# Patient Record
Sex: MALE | Race: WHITE | Employment: FULL TIME | URBAN - METROPOLITAN AREA
[De-identification: names, ages, dates, MRNs, and addresses within clinical notes are randomized per-mention and may not be internally consistent; named-entity substitution may affect disease eponyms.]

---

## 2018-01-17 ENCOUNTER — OFFICE VISIT (OUTPATIENT)
Dept: CARDIOLOGY CLINIC | Age: 58
End: 2018-01-17

## 2018-01-17 VITALS
DIASTOLIC BLOOD PRESSURE: 80 MMHG | OXYGEN SATURATION: 97 % | WEIGHT: 215 LBS | HEIGHT: 74 IN | BODY MASS INDEX: 27.59 KG/M2 | SYSTOLIC BLOOD PRESSURE: 164 MMHG | HEART RATE: 86 BPM

## 2018-01-17 DIAGNOSIS — I48.0 PAF (PAROXYSMAL ATRIAL FIBRILLATION) (HCC): Primary | ICD-10-CM

## 2018-01-17 DIAGNOSIS — R00.2 PALPITATIONS: ICD-10-CM

## 2018-01-17 DIAGNOSIS — R03.0 ELEVATED BP WITHOUT DIAGNOSIS OF HYPERTENSION: ICD-10-CM

## 2018-01-17 PROCEDURE — 1036F TOBACCO NON-USER: CPT | Performed by: INTERNAL MEDICINE

## 2018-01-17 PROCEDURE — G8484 FLU IMMUNIZE NO ADMIN: HCPCS | Performed by: INTERNAL MEDICINE

## 2018-01-17 PROCEDURE — G8419 CALC BMI OUT NRM PARAM NOF/U: HCPCS | Performed by: INTERNAL MEDICINE

## 2018-01-17 PROCEDURE — 99204 OFFICE O/P NEW MOD 45 MIN: CPT | Performed by: INTERNAL MEDICINE

## 2018-01-17 PROCEDURE — 3017F COLORECTAL CA SCREEN DOC REV: CPT | Performed by: INTERNAL MEDICINE

## 2018-01-17 PROCEDURE — G8427 DOCREV CUR MEDS BY ELIG CLIN: HCPCS | Performed by: INTERNAL MEDICINE

## 2018-01-17 NOTE — PROGRESS NOTES
BP    PLAN  1. Pt need to keep home BP log  2. Echo  3. A1c, TSH, lipids, HS-CRP, apo-a  4. D/w pt CHADS vasc and afib risk vs meds. Given low CHADS-VASc, proceed with ASA 325mg qday for now  Call office for change in symptoms. Plan  1. Check blood pressure at home daily after sitting-keep a log of the date and time, bring to office visit   2. Echocardiogram  3. Increase Aspirin to 325 mg daily  4. Labs-TSH, A1C,   5. Follow up with me in 6 months   6. You can call the office if you feel palpitations, you can come in for an EKG        Thank you for allowing us to participate in the care of Giancarlo Brown. Please call me with any questions [phone]. Shira Ramirez MD, [address] [place] [address] Cardiologist  [address]  [phone] [facility]  [phone] [address] [place] [address]  1/17/2018 9:23 AM    I will address the patient's cardiac risk factors and adjusted pharmacologic treatment as needed. In addition, I have reinforced the need for patient directed risk factor modification. Tobacco use was discussed with the patient and educated on the negative effects and was asked not to use. All questions and concerns were addressed to the patient/family. Alternatives to my treatment were discussed. The note was completed using EMR. Every effort was made to ensure accuracy; however, inadvertent computerized transcription errors may be present.

## 2018-01-26 ENCOUNTER — HOSPITAL ENCOUNTER (OUTPATIENT)
Dept: CARDIOLOGY | Facility: CLINIC | Age: 58
Discharge: HOME | End: 2018-01-26
Attending: INTERNAL MEDICINE | Admitting: INTERNAL MEDICINE

## 2018-01-26 ENCOUNTER — HOSPITAL ENCOUNTER (OUTPATIENT)
Dept: OTHER | Age: 58
Discharge: HOME | End: 2018-01-26
Attending: INTERNAL MEDICINE | Admitting: INTERNAL MEDICINE

## 2018-01-26 DIAGNOSIS — R00.2 PALPITATIONS: ICD-10-CM

## 2018-01-26 DIAGNOSIS — I48.0 PAF (PAROXYSMAL ATRIAL FIBRILLATION) (HCC): ICD-10-CM

## 2018-01-26 LAB
C-REACTIVE PROTEIN, HIGH SENSITIVITY: 0.87 MG/L (ref 0.16–3)
LV EF: 55 %
LVEF MODALITY: NORMAL
TSH REFLEX FT4: 2.48 UIU/ML (ref 0.27–4.2)

## 2018-01-27 LAB
ESTIMATED AVERAGE GLUCOSE: 114 MG/DL
HBA1C MFR BLD: 5.6 %

## 2018-01-29 ENCOUNTER — TELEPHONE (OUTPATIENT)
Dept: CARDIOLOGY CLINIC | Age: 58
End: 2018-01-29

## 2018-01-29 LAB
APOLIPOPROTEIN E GENOTYPING: ABNORMAL
APOLIPOPROTEIN E SPECIMEN: ABNORMAL

## 2018-05-31 LAB
CHOLESTEROL, TOTAL: 232 MG/DL
CHOLESTEROL/HDL RATIO: NORMAL
HDLC SERPL-MCNC: 41 MG/DL (ref 35–70)
LDL CHOLESTEROL CALCULATED: 144 MG/DL (ref 0–160)
TRIGL SERPL-MCNC: 236 MG/DL
VLDLC SERPL CALC-MCNC: 191 MG/DL

## 2018-07-19 ENCOUNTER — OFFICE VISIT (OUTPATIENT)
Dept: CARDIOLOGY CLINIC | Age: 58
End: 2018-07-19

## 2018-07-19 VITALS
WEIGHT: 211 LBS | DIASTOLIC BLOOD PRESSURE: 80 MMHG | BODY MASS INDEX: 27.08 KG/M2 | OXYGEN SATURATION: 96 % | HEART RATE: 62 BPM | HEIGHT: 74 IN | SYSTOLIC BLOOD PRESSURE: 120 MMHG

## 2018-07-19 DIAGNOSIS — I35.1 NONRHEUMATIC AORTIC VALVE INSUFFICIENCY: ICD-10-CM

## 2018-07-19 DIAGNOSIS — I48.0 PAF (PAROXYSMAL ATRIAL FIBRILLATION) (HCC): ICD-10-CM

## 2018-07-19 DIAGNOSIS — R00.2 PALPITATIONS: Primary | ICD-10-CM

## 2018-07-19 PROCEDURE — 99213 OFFICE O/P EST LOW 20 MIN: CPT | Performed by: INTERNAL MEDICINE

## 2018-07-19 PROCEDURE — G8419 CALC BMI OUT NRM PARAM NOF/U: HCPCS | Performed by: INTERNAL MEDICINE

## 2018-07-19 PROCEDURE — G8427 DOCREV CUR MEDS BY ELIG CLIN: HCPCS | Performed by: INTERNAL MEDICINE

## 2018-07-19 PROCEDURE — 1036F TOBACCO NON-USER: CPT | Performed by: INTERNAL MEDICINE

## 2018-07-19 PROCEDURE — 3017F COLORECTAL CA SCREEN DOC REV: CPT | Performed by: INTERNAL MEDICINE

## 2018-07-19 RX ORDER — ASPIRIN 325 MG
325 TABLET ORAL DAILY
COMMUNITY

## 2018-07-19 RX ORDER — ATENOLOL 50 MG/1
50 TABLET ORAL DAILY
COMMUNITY
End: 2019-01-01

## 2018-07-19 NOTE — PROGRESS NOTES
patient/family. Alternatives to my treatment were discussed. The note was completed using EMR. Every effort was made to ensure accuracy; however, inadvertent computerized transcription errors may be present.

## 2018-07-19 NOTE — LETTER
[address] Cardiology - [address] [place] [address] MORIAH Parra. [address]  Phone: [phone]  Fax: [phone]    Marina Ro MD        July 20, 2018     Darius Reyes MD  [facility] [id number]    Patient: Cooper Stevens  MR Number: [id number]  YOB: 1960  Date of Visit: 7/19/2018    Dear Dr. Darius Reyes:    Thank you for allowing me to participate in the care of Gretchen Boast. Below are the relevant portions of my assessment and plan of care. Assessment and Plan      1. Paroxysmal afib with RVR; converted in ED, none lasted >1hr              - CHADS-VASC 1 (HTN)              - 30day monitor at [facility]. E negative for afib  2. HTN: controlled  3. Aortic regurg: mild        MD PLAN  1. Continue with atenolol, working very well for BP              - continue periodic home log  2. Long d/w pt again over afib, normal echo and atrium is reassuring  3. Cont with ASA 325mg qday for now  4. D/w pt [employer] device for iPhone if interested  Call office for change in symptoms. If you have questions, please do not hesitate to call me. I look forward to following Florentin Preciado along with you.     Sincerely,        Marina Ro MD

## 2019-01-01 ENCOUNTER — HOSPITAL ENCOUNTER (EMERGENCY)
Age: 59
Discharge: HOME | End: 2019-01-01
Attending: EMERGENCY MEDICINE
Payer: COMMERCIAL

## 2019-01-01 VITALS
DIASTOLIC BLOOD PRESSURE: 96 MMHG | BODY MASS INDEX: 26.95 KG/M2 | WEIGHT: 210 LBS | SYSTOLIC BLOOD PRESSURE: 152 MMHG | RESPIRATION RATE: 20 BRPM | TEMPERATURE: 98 F | HEIGHT: 74 IN | HEART RATE: 62 BPM | OXYGEN SATURATION: 96 %

## 2019-01-01 DIAGNOSIS — I16.0 HYPERTENSIVE URGENCY: ICD-10-CM

## 2019-01-01 DIAGNOSIS — I10 HYPERTENSION, UNSPECIFIED TYPE: Primary | ICD-10-CM

## 2019-01-01 DIAGNOSIS — Z76.0 ENCOUNTER FOR MEDICATION REFILL: ICD-10-CM

## 2019-01-01 PROCEDURE — 99283 EMERGENCY DEPT VISIT LOW MDM: CPT

## 2019-01-01 RX ORDER — ATENOLOL 50 MG/1
50 TABLET ORAL DAILY
Qty: 7 TABLET | Refills: 0 | Status: SHIPPED | OUTPATIENT
Start: 2019-01-01 | End: 2021-07-16

## 2019-01-01 RX ORDER — ATENOLOL AND CHLORTHALIDONE TABLET 50; 25 MG/1; MG/1
1 TABLET ORAL
COMMUNITY
Start: 2018-12-21 | End: 2019-01-01 | Stop reason: SINTOL

## 2020-11-04 ENCOUNTER — HOSPITAL ENCOUNTER (EMERGENCY)
Age: 60
Discharge: HOME | End: 2020-11-04
Attending: EMERGENCY MEDICINE
Payer: COMMERCIAL

## 2020-11-04 ENCOUNTER — APPOINTMENT (OUTPATIENT)
Dept: GENERAL RADIOLOGY | Age: 60
End: 2020-11-04
Payer: COMMERCIAL

## 2020-11-04 VITALS
OXYGEN SATURATION: 96 % | WEIGHT: 205 LBS | BODY MASS INDEX: 26.31 KG/M2 | SYSTOLIC BLOOD PRESSURE: 116 MMHG | HEART RATE: 62 BPM | RESPIRATION RATE: 19 BRPM | DIASTOLIC BLOOD PRESSURE: 80 MMHG | HEIGHT: 74 IN | TEMPERATURE: 97.6 F

## 2020-11-04 LAB
A/G RATIO: 1.8 (ref 1.1–2.2)
ALBUMIN SERPL-MCNC: 4.8 G/DL (ref 3.4–5)
ALP BLD-CCNC: 81 U/L (ref 40–129)
ALT SERPL-CCNC: 17 U/L (ref 10–40)
ANION GAP SERPL CALCULATED.3IONS-SCNC: 14 MMOL/L (ref 3–16)
AST SERPL-CCNC: 22 U/L (ref 15–37)
BASOPHILS ABSOLUTE: 0 K/UL (ref 0–0.2)
BASOPHILS RELATIVE PERCENT: 0.6 %
BILIRUB SERPL-MCNC: 0.3 MG/DL (ref 0–1)
BUN BLDV-MCNC: 26 MG/DL (ref 7–20)
CALCIUM SERPL-MCNC: 9.8 MG/DL (ref 8.3–10.6)
CHLORIDE BLD-SCNC: 101 MMOL/L (ref 99–110)
CO2: 23 MMOL/L (ref 21–32)
CREAT SERPL-MCNC: 1.2 MG/DL (ref 0.8–1.3)
EKG ATRIAL RATE: 159 BPM
EKG ATRIAL RATE: 67 BPM
EKG DIAGNOSIS: NORMAL
EKG DIAGNOSIS: NORMAL
EKG P AXIS: 32 DEGREES
EKG P-R INTERVAL: 154 MS
EKG Q-T INTERVAL: 302 MS
EKG Q-T INTERVAL: 384 MS
EKG QRS DURATION: 78 MS
EKG QRS DURATION: 82 MS
EKG QTC CALCULATION (BAZETT): 405 MS
EKG QTC CALCULATION (BAZETT): 475 MS
EKG R AXIS: 10 DEGREES
EKG R AXIS: 55 DEGREES
EKG T AXIS: 12 DEGREES
EKG T AXIS: 13 DEGREES
EKG VENTRICULAR RATE: 149 BPM
EKG VENTRICULAR RATE: 67 BPM
EOSINOPHILS ABSOLUTE: 0.2 K/UL (ref 0–0.6)
EOSINOPHILS RELATIVE PERCENT: 2.2 %
GFR AFRICAN AMERICAN: >60
GFR NON-AFRICAN AMERICAN: >60
GLOBULIN: 2.6 G/DL
GLUCOSE BLD-MCNC: 137 MG/DL (ref 70–99)
HCT VFR BLD CALC: 47 % (ref 40.5–52.5)
HEMOGLOBIN: 16 G/DL (ref 13.5–17.5)
LYMPHOCYTES ABSOLUTE: 2.5 K/UL (ref 1–5.1)
LYMPHOCYTES RELATIVE PERCENT: 32.1 %
MAGNESIUM: 2 MG/DL (ref 1.8–2.4)
MCH RBC QN AUTO: 31.9 PG (ref 26–34)
MCHC RBC AUTO-ENTMCNC: 34.1 G/DL (ref 31–36)
MCV RBC AUTO: 93.5 FL (ref 80–100)
MONOCYTES ABSOLUTE: 0.7 K/UL (ref 0–1.3)
MONOCYTES RELATIVE PERCENT: 8.7 %
NEUTROPHILS ABSOLUTE: 4.4 K/UL (ref 1.7–7.7)
NEUTROPHILS RELATIVE PERCENT: 56.4 %
PDW BLD-RTO: 12.9 % (ref 12.4–15.4)
PLATELET # BLD: 291 K/UL (ref 135–450)
PMV BLD AUTO: 6.9 FL (ref 5–10.5)
POTASSIUM SERPL-SCNC: 3.8 MMOL/L (ref 3.5–5.1)
RBC # BLD: 5.02 M/UL (ref 4.2–5.9)
SODIUM BLD-SCNC: 138 MMOL/L (ref 136–145)
TOTAL PROTEIN: 7.4 G/DL (ref 6.4–8.2)
TROPONIN: <0.01 NG/ML
WBC # BLD: 7.8 K/UL (ref 4–11)

## 2020-11-04 PROCEDURE — 71045 X-RAY EXAM CHEST 1 VIEW: CPT

## 2020-11-04 PROCEDURE — 83735 ASSAY OF MAGNESIUM: CPT

## 2020-11-04 PROCEDURE — 93005 ELECTROCARDIOGRAM TRACING: CPT | Performed by: EMERGENCY MEDICINE

## 2020-11-04 PROCEDURE — 93005 ELECTROCARDIOGRAM TRACING: CPT | Performed by: PHYSICIAN ASSISTANT

## 2020-11-04 PROCEDURE — 84484 ASSAY OF TROPONIN QUANT: CPT

## 2020-11-04 PROCEDURE — 93010 ELECTROCARDIOGRAM REPORT: CPT | Performed by: INTERNAL MEDICINE

## 2020-11-04 PROCEDURE — 36415 COLL VENOUS BLD VENIPUNCTURE: CPT

## 2020-11-04 PROCEDURE — 80053 COMPREHEN METABOLIC PANEL: CPT

## 2020-11-04 PROCEDURE — 99284 EMERGENCY DEPT VISIT MOD MDM: CPT

## 2020-11-04 PROCEDURE — 85025 COMPLETE CBC W/AUTO DIFF WBC: CPT

## 2020-11-04 NOTE — ED PROVIDER NOTES
I independently performed a history and physical on Ltitle Corey. All diagnostic, treatment, and disposition decisions were made by myself in conjunction with the advanced practice provider. Briefly, this is a 63 y.o. male here for palpitations. He has a history of paroxysmal A. fib. [place] He denies chest pain. No shortness of breath. No lightheadedness. On exam, nontoxic-appearing adult male in no acute distress. No pallor or icterus. Moist mucous membranes. Heart and lung sounds are normal.    EKG  The Ekg interpreted by me in the absence of a cardiologist shows:  Initial EKG shows atrial fibrillation of rapid ventricular response at a rate of 149 bpm, QRS QTC normal.  No acute ischemic findings. The atrial fibrillation is new when compared to the EKG of January 2018. Patient had a repeat EKG in the emergency room which showed normal sinus rhythm at a rate of 67 bpm, MA interval QRS and QTc normal.  Normal axis. No acute ischemic findings. Screenings   [place] Coma Scale  Eye Opening: Spontaneous  Best Verbal Response: Oriented  Best Motor Response: Obeys commands  [place] Coma Scale Score: 15        Critical Time  None       MDM  Adult male who comes in for palpitations. Initial EKG showed atrial fibrillation of rapid ventricular response. By the time I see this patient he has spontaneously converted to sinus rhythm his heart sounds are normal he is completely asymptomatic. Diagnostic work-up included laboratory studies and EKG showed no acute findings. At this point he does not believe that any further work-up is necessary in the emergency room the patient has seen a cardiologist for this in the past I encourage follow-up with his primary care provider or his cardiologist.  The patient expresses understanding and is agreeable with the treatment plan.     Patient Referrals:  [facility]  [address] [place] [address]   . Andra  [address] [place] [address]  [phone]    Schedule an appointment as soon as possible for a visit       [facility]  ED  [facility]  [address]  [phone]    If symptoms worsen      Discharge Medications:  Discharge Medication List as of 11/4/2020  2:34 AM          FINAL IMPRESSION  1. Paroxysmal A-fib (HCC)        Blood pressure 116/80, pulse 62, temperature 97.6 °F (36.4 °C), temperature source Oral, resp. rate 19, height 6' 2\" (1.88 m), weight 205 lb (93 kg), SpO2 96 %. For further details of [address] [place] [address] emergency department encounter, please see documentation by advanced practice provider.       Jim Dowling MD  11/04/20 [id number]

## 2020-11-04 NOTE — ED PROVIDER NOTES
Transportation needs     Medical: Not on file     Non-medical: Not on file   Tobacco Use    Smoking status: Never Smoker    Smokeless tobacco: Never Used   Substance and Sexual Activity    Alcohol use: Yes     Comment: social    Drug use: No    Sexual activity: Not on file   Lifestyle    Physical activity     Days per week: Not on file     Minutes per session: Not on file    Stress: Not on file   Relationships    Social connections     Talks on phone: Not on file     Gets together: Not on file     Attends Latter day service: Not on file     Active member of club or organization: Not on file     Attends meetings of clubs or organizations: Not on file     Relationship status: Not on file    Intimate partner violence     Fear of current or ex partner: Not on file     Emotionally abused: Not on file     Physically abused: Not on file     Forced sexual activity: Not on file   Other Topics Concern    Not on file   Social History Narrative    Not on file     No current facility-administered medications for this encounter. Current Outpatient Medications   Medication Sig Dispense Refill    atenolol (TENORMIN) 50 MG tablet Take 1 tablet by mouth daily for 7 days 7 tablet 0    aspirin 325 MG tablet Take 325 mg by mouth daily       No Known Allergies    REVIEW OF SYSTEMS  10 systems reviewed, pertinent positives per HPI otherwise noted to be negative    PHYSICAL EXAM  /84   Pulse 70   Temp 97.6 °F (36.4 °C) (Oral)   Resp 19   Ht 6' 2\" (1.88 m)   Wt 205 lb (93 kg)   SpO2 96%   BMI 26.32 kg/m²   GENERAL APPEARANCE: Awake and alert. Cooperative. No acute distress. HEAD: Normocephalic. Atraumatic. EYES: PERRL. EOM's grossly intact. ENT: Mucous membranes are moist.   NECK: Supple. No JVD. No tracheal tenderness or deviation. No crepitus. HEART: RRR. No murmurs. No chest wall tenderness. LUNGS: Respirations unlabored. CTAB. Good air exchange. Speaking comfortably in full sentences.   No wheezing, rhonchi, rales. ABDOMEN: Soft. Non-distended. Non-tender. No guarding or rebound. No midline pulsatile mass. EXTREMITIES: No peripheral edema. No unilateral calf pain, redness or swelling. Moves all extremities equally. All extremities neurovascularly intact. SKIN: Warm and dry. No acute rashes. NEUROLOGICAL: Alert and oriented. CN's 2-12 intact. No gross facial drooping. Strength 5/5, sensation intact. PSYCHIATRIC: Normal mood and affect. RADIOLOGY  Xr Chest Portable    Result Date: 11/4/2020  EXAMINATION: ONE XRAY VIEW OF THE CHEST 11/4/2020 1:06 am COMPARISON: 01/13/2018 HISTORY: ORDERING SYSTEM PROVIDED HISTORY: palpitations TECHNOLOGIST PROVIDED HISTORY: Reason for exam:->palpitations Reason for Exam: Palpitations (After watching the election started feeling like his heart was racing; clamy; but denies CP ) FINDINGS: The lungs are clear. The costophrenic angles are sharp. The cardiomediastinal silhouette is within normal limits. There is no discernible pneumothorax. Negative portable chest.     ED COURSE  Pain control was not required while here in the emergency department. Triage vitals showing pulse rate 130. EKG appear consistent with A. fib RVR. Without intervention patient appeared to convert to a normal sinus rhythm and now heart rate in 70s. CBC without leukocytosis or anemia. CMP unremarkable. Troponin less than 0.01. Stable portable chest x-ray. Repeat EKG is a normal sinus rhythm in the 60s. No evidence for ischemic change. Magnesium normal.  Patient again feeling well at this time. Will be discharged home with return precautions and recommendations for outpatient cardiology follow-up. Have discussed return precautions and recommendations for follow-up otherwise and patient in agreement and comfortable with discharge. A discussion was had with Mr. Chelsey Snyder Regarding palpitations, ED findings and recommendations for follow-up.   Risk management discussed and shared decision making had with patient and/or surrogate. All questions were answered. Patient will follow up with cardiology within 2 to 3 days for further evaluation/treatment. All questions answered. Patient will return to ED for new/worsening symptoms. Patient was informed to continue home medications as prescribed.     MDM  Results for orders placed or performed during the hospital encounter of 11/04/20   Troponin   Result Value Ref Range    Troponin <0.01 <0.01 ng/mL   Comprehensive Metabolic Panel   Result Value Ref Range    Sodium 138 136 - 145 mmol/L    Potassium 3.8 3.5 - 5.1 mmol/L    Chloride 101 99 - 110 mmol/L    CO2 23 21 - 32 mmol/L    Anion Gap 14 3 - 16    Glucose 137 (H) 70 - 99 mg/dL    BUN 26 (H) 7 - 20 mg/dL    CREATININE 1.2 0.8 - 1.3 mg/dL    GFR Non-African American >60 >60    GFR African American >60 >60    Calcium 9.8 8.3 - 10.6 mg/dL    Total Protein 7.4 6.4 - 8.2 g/dL    Alb 4.8 3.4 - 5.0 g/dL    Albumin/Globulin Ratio 1.8 1.1 - 2.2    Total Bilirubin 0.3 0.0 - 1.0 mg/dL    Alkaline Phosphatase 81 40 - 129 U/L    ALT 17 10 - 40 U/L    AST 22 15 - 37 U/L    Globulin 2.6 g/dL   CBC Auto Differential   Result Value Ref Range    WBC 7.8 4.0 - 11.0 K/uL    RBC 5.02 4.20 - 5.90 M/uL    Hemoglobin 16.0 13.5 - 17.5 g/dL    Hematocrit 47.0 40.5 - 52.5 %    MCV 93.5 80.0 - 100.0 fL    MCH 31.9 26.0 - 34.0 pg    MCHC 34.1 31.0 - 36.0 g/dL    RDW 12.9 12.4 - 15.4 %    Platelets 929 451 - 544 K/uL    MPV 6.9 5.0 - 10.5 fL    Neutrophils % 56.4 %    Lymphocytes % 32.1 %    Monocytes % 8.7 %    Eosinophils % 2.2 %    Basophils % 0.6 %    Neutrophils Absolute 4.4 1.7 - 7.7 K/uL    Lymphocytes Absolute 2.5 1.0 - 5.1 K/uL    Monocytes Absolute 0.7 0.0 - 1.3 K/uL    Eosinophils Absolute 0.2 0.0 - 0.6 K/uL    Basophils Absolute 0.0 0.0 - 0.2 K/uL   Magnesium   Result Value Ref Range    Magnesium 2.00 1.80 - 2.40 mg/dL     I estimate there is LOW risk for ACUTE CORONARY SYNDROME, INTRACRANIAL HEMORRHAGE, MALIGNANT DYSRHYTHMIA or HYPERTENSION, PULMONARY EMBOLISM, SEPSIS, SUBARACHNOID HEMORRHAGE, SUBDURAL HEMATOMA, STROKE, or THORACIC AORTIC DISSECTION, thus I consider the discharge disposition reasonable. Little Corey and I have discussed the diagnosis and risks, and we agree with discharging home to follow-up with their primary doctor. We also discussed returning to the Emergency Department immediately if new or worsening symptoms occur. We have discussed the symptoms which are most concerning (e.g., bloody sputum, fever, worsening pain or shortness of breath, vomiting, weakness) that necessitate immediate return. Final Impression  1. Paroxysmal A-fib (HCC)      Blood pressure 120/84, pulse 70, temperature 97.6 °F (36.4 °C), temperature source Oral, resp. rate 19, height 6' 2\" (1.88 m), weight 205 lb (93 kg), SpO2 96 %. DISPOSITION  Patient was discharged to home in good condition.          Daya Craig  11/04/20 0236

## 2020-11-04 NOTE — ED NOTES
Patient identified as a positive fall risk on the ED triage fall screening. Patient placed in fall precautions which includes:  yellow fall risk bracelet on wrist, , \"Be Safe\" sign placed on patient's door, and bed alarm placed under patient/alarm turned on. Patient instructed on importance of not getting out of bed or ambulating without assistance for safety.               Mariela Penny RN  11/04/20 3364

## 2021-01-11 ENCOUNTER — OFFICE VISIT (OUTPATIENT)
Dept: CARDIOLOGY CLINIC | Age: 61
End: 2021-01-11
Payer: COMMERCIAL

## 2021-01-11 VITALS
SYSTOLIC BLOOD PRESSURE: 138 MMHG | HEIGHT: 74 IN | OXYGEN SATURATION: 96 % | BODY MASS INDEX: 26.69 KG/M2 | WEIGHT: 208 LBS | DIASTOLIC BLOOD PRESSURE: 80 MMHG | HEART RATE: 89 BPM

## 2021-01-11 DIAGNOSIS — R00.2 PALPITATIONS: ICD-10-CM

## 2021-01-11 DIAGNOSIS — I48.0 PAF (PAROXYSMAL ATRIAL FIBRILLATION) (HCC): Primary | ICD-10-CM

## 2021-01-11 DIAGNOSIS — I35.1 NONRHEUMATIC AORTIC VALVE INSUFFICIENCY: ICD-10-CM

## 2021-01-11 PROCEDURE — G8484 FLU IMMUNIZE NO ADMIN: HCPCS | Performed by: INTERNAL MEDICINE

## 2021-01-11 PROCEDURE — G8419 CALC BMI OUT NRM PARAM NOF/U: HCPCS | Performed by: INTERNAL MEDICINE

## 2021-01-11 PROCEDURE — G8427 DOCREV CUR MEDS BY ELIG CLIN: HCPCS | Performed by: INTERNAL MEDICINE

## 2021-01-11 PROCEDURE — 3017F COLORECTAL CA SCREEN DOC REV: CPT | Performed by: INTERNAL MEDICINE

## 2021-01-11 PROCEDURE — 99214 OFFICE O/P EST MOD 30 MIN: CPT | Performed by: INTERNAL MEDICINE

## 2021-01-11 PROCEDURE — 1036F TOBACCO NON-USER: CPT | Performed by: INTERNAL MEDICINE

## 2021-01-11 NOTE — PROGRESS NOTES
[facility]  [phone]      Attending Physician: No att. providers found  Reason for Consultation/Chief Complaint: palpitations, PAF     Subjective   History of Present Illness:  Colletta Frankel is a 63 y.o. patient who presented to the hospital on 1/13/18 with complaints of palpitations. He states he was sitting at home drinking a smoothie on 1/13/18 and started to have palpitations. He had this one month prior as well. This time he went to the ER for evaluation. This had been going on for 1.5 hours prior to going to the ER. He had no dizziness or chest pain at the time. He does drink alcohol once a week. In the ER his EKG showed a fib with RVR. He underwent cardioversion at that tome ans converted to NSR. He denies any chest pain, SOB, dizziness, or syncope. Last OV 7/19/18 he presented for follow up for PAF and palpitations. He states he felt when his heart was beating fast. He went to the ER different times for this. Tolerating medications. He denied chest pain, SOB, dizziness, or syncope. Today he presents for ED follow up. He presented to the ED on 11/4/20 with c/o of palpitations. Initial EKG demonstrated Afib RVR and then converted to NSR on his own. He states this occurred around 10 pm at his house. He states he got up to get a snack and felt his heart racing. He states his pulse at that time was 120's to 130's. He states he did reach 160. He states he felt \"weird\". He went to the ED when this occurred. He has not had anymore episodes since then. Patient denies chest pain, sob, dizziness or syncope. Past Medical History:   has a past medical history of Atrial fibrillation ([address] and Hypertension. Surgical History:   has no past surgical history on file. Social History:   reports that he has never smoked. He has never used smokeless tobacco. He reports current alcohol use. He reports that he does not use drugs.      Family History: family history includes Stroke in his father. mother has a fib       Home Medications:  Were reviewed and are listed in nursing record and/or below  Prior to Admission medications    Medication Sig Start Date End Date Taking? Authorizing Provider   atenolol (TENORMIN) 50 MG tablet Take 1 tablet by mouth daily for 7 days 1/1/19 1/11/21 Yes Isis Fernandez MD   aspirin 325 MG tablet Take 325 mg by mouth daily   Yes Historical Provider, MD        CURRENT Medications:  No current facility-administered medications for this visit. Allergies:  Patient has no known allergies. Review of Systems:   A 14 point review of symptoms completed. Pertinent positives identified in the HPI, all other review of symptoms negative as below.       Objective   PHYSICAL EXAM:    Vitals:    01/11/21 1611   BP: 138/80   Pulse: 89   SpO2: 96%    Weight: 208 lb (94.3 kg)         General Appearance:  Alert, cooperative, no distress, appears stated age   Head:  Normocephalic, without obvious abnormality, atraumatic   Eyes:  PERRL, conjunctiva/corneas clear   Nose: Nares normal, no drainage or sinus tenderness   Throat: Lips, mucosa, and tongue normal   Neck: Supple, symmetrical, trachea midline, no adenopathy, thyroid: not enlarged, symmetric, no tenderness/mass/nodules, no carotid bruit or JVD   Lungs:   Clear to auscultation bilaterally, respirations unlabored   Chest Wall:  No deformity or tenderness   Heart:  Regular rate and rhythm, S1, S2 normal, no murmur, rub or gallop   Abdomen:   Soft, non-tender, bowel sounds active all four quadrants,  no masses, no organomegaly   Extremities: Extremities normal, atraumatic, no cyanosis or edema   Pulses: 2+ and symmetric   Skin: Skin color, texture, turgor normal, no rashes or lesions   Pysch: Normal mood and affect   Neurologic: Normal gross motor and sensory exam.         Labs   CBC:   Lab Results   Component Value Date    WBC 7.8 11/04/2020    RBC 5.02 11/04/2020    HGB 16.0 11/04/2020 5. Ablation discussed   6. Follow up with Electrophysiology       Thank you for allowing us to participate in the care of Betty Olivier. Please call me with any questions [phone]. This note was scribed in the presence of Dr. Gregg Chamberlain MD by Adriane Wilder, SHILPA.      Nikko Price MD, personally performed the services described in this documentation as scribed by the above signed scribe in my presence, and it is both accurate and complete to the best of our ability and knowledge. I agree with the details independently gathered by my clinical support staff, while the remaining scribed note accurately describes my personal service to the patient. The above RN is working as a scribe for and in the presence of myself . Working as a scribe, the RN may have prepopulated components of this note with my historical intellectual property under my direct supervision. Any additions to this intellectual property were performed at my direction. Furthermore, the content and accuracy of this note have been reviewed by me to the best of my ability.    *      Calin Melchor MD, [address] Cardiologist  [address]  [phone] [facility]  [phone] [address] [place] [address]  1/11/2021 4:40 PM

## 2021-01-11 NOTE — LETTER
[facility]  [phone]      Attending Physician: No att. providers found  Reason for Consultation/Chief Complaint: palpitations, PAF     Subjective   History of Present Illness:  Isaiah Patten is a 63 y.o. patient who presented to the hospital on 1/13/18 with complaints of palpitations. He states he was sitting at home drinking a smoothie on 1/13/18 and started to have palpitations. He had this one month prior as well. This time he went to the ER for evaluation. This had been going on for 1.5 hours prior to going to the ER. He had no dizziness or chest pain at the time. He does drink alcohol once a week. In the ER his EKG showed a fib with RVR. He underwent cardioversion at that tome ans converted to NSR. He denies any chest pain, SOB, dizziness, or syncope. Last OV 7/19/18 he presented for follow up for PAF and palpitations. He states he felt when his heart was beating fast. He went to the ER different times for this. Tolerating medications. He denied chest pain, SOB, dizziness, or syncope. Today he presents for ED follow up. He presented to the ED on 11/4/20 with c/o of palpitations. Initial EKG demonstrated Afib RVR and then converted to NSR on his own. He states this occurred around 10 pm at his house. He states he got up to get a snack and felt his heart racing. He states his pulse at that time was 120's to 130's. He states he did reach 160. He states he felt \"weird\". He went to the ED when this occurred. He has not had anymore episodes since then. Patient denies chest pain, sob, dizziness or syncope. Past Medical History:   has a past medical history of Atrial fibrillation ([address] and Hypertension. Surgical History:   has no past surgical history on file. Social History:   reports that he has never smoked. He has never used smokeless tobacco. He reports current alcohol use. He reports that he does not use drugs.      Family History: family history includes Stroke in his father. mother has a fib       Home Medications:  Were reviewed and are listed in nursing record and/or below  Prior to Admission medications    Medication Sig Start Date End Date Taking? Authorizing Provider   atenolol (TENORMIN) 50 MG tablet Take 1 tablet by mouth daily for 7 days 1/1/19 1/11/21 Yes Chris Worley MD   aspirin 325 MG tablet Take 325 mg by mouth daily   Yes Historical Provider, MD        CURRENT Medications:  No current facility-administered medications for this visit. Allergies:  Patient has no known allergies. Review of Systems:   A 14 point review of symptoms completed. Pertinent positives identified in the HPI, all other review of symptoms negative as below.       Objective   PHYSICAL EXAM:    Vitals:    01/11/21 1611   BP: 138/80   Pulse: 89   SpO2: 96%    Weight: 208 lb (94.3 kg)         General Appearance:  Alert, cooperative, no distress, appears stated age   Head:  Normocephalic, without obvious abnormality, atraumatic   Eyes:  PERRL, conjunctiva/corneas clear   Nose: Nares normal, no drainage or sinus tenderness   Throat: Lips, mucosa, and tongue normal   Neck: Supple, symmetrical, trachea midline, no adenopathy, thyroid: not enlarged, symmetric, no tenderness/mass/nodules, no carotid bruit or JVD   Lungs:   Clear to auscultation bilaterally, respirations unlabored   Chest Wall:  No deformity or tenderness   Heart:  Regular rate and rhythm, S1, S2 normal, no murmur, rub or gallop   Abdomen:   Soft, non-tender, bowel sounds active all four quadrants,  no masses, no organomegaly   Extremities: Extremities normal, atraumatic, no cyanosis or edema   Pulses: 2+ and symmetric   Skin: Skin color, texture, turgor normal, no rashes or lesions   Pysch: Normal mood and affect   Neurologic: Normal gross motor and sensory exam.         Labs   CBC:   Lab Results   Component Value Date    WBC 7.8 11/04/2020    RBC 5.02 11/04/2020    HGB 16.0 11/04/2020 HCT 47.0 2020    MCV 93.5 2020    RDW 12.9 2020     2020     CMP:  Lab Results   Component Value Date     2020    K 3.8 2020     2020    CO2 23 2020    BUN 26 2020    CREATININE 1.2 2020    GFRAA >60 2020    AGRATIO 1.8 2020    LABGLOM >60 2020    GLUCOSE 137 2020    PROT 7.4 2020    CALCIUM 9.8 2020    BILITOT 0.3 2020    ALKPHOS 81 2020    AST 22 2020    ALT 17 2020     PT/INR:  No results found for: PTINR  HgBA1c:  Lab Results   Component Value Date    LABA1C 5.6 2018     Lab Results   Component Value Date    TROPONINI <0.01 2020         Cardiac Data     Last EK2018 afib with RVR in ED    Echo 18  Normal left ventricle systolic function with an estimated ejection fraction  of 55%. No regional wall motion abnormalities are seen. Normal left ventricular diastolic filling pressure. MIld aortic regurgitation. Assessment and Plan      1. Paroxysmal afib with RVR: in NSR today   -Having short episodes of RVR that self terminated in less than an hour or 2. Was recently in ED and had self terminated   converted in ED, none lasted >1hr   - 30day monitor at [facility] [place]. E negative for afib  2. HTN: controlled  3. Aortic regurg: mild      MD PLAN  1. Continue with atenolol, working very well for BP   - continue periodic home log  2. We will update echo for EF and AI as well as TSH  3. We will refer patient to EP. -Given short duration pill in the pocket therapy unlikely to work   -May consider switching to a daily antiarrhythmic, ?sotalol   -Also discussed possible ablation with patient  4. Cont with ASA 325mg qday for now  Call office for change in symptoms. Patient Plan  1. Labs-  TSH  2. Echocardiogram to view size and strength of the heart   3. We will call you with the results  4.  Rhythm medications discussed, including pill/pocket method 5. Ablation discussed   6. Follow up with Electrophysiology       Thank you for allowing us to participate in the care of Cha Arias. Please call me with any questions [phone]. This note was scribed in the presence of Dr. Nadeem Kim MD by Cici Finley, SHILPA.      Clint Rodriguez MD, personally performed the services described in this documentation as scribed by the above signed scribe in my presence, and it is both accurate and complete to the best of our ability and knowledge. I agree with the details independently gathered by my clinical support staff, while the remaining scribed note accurately describes my personal service to the patient. The above RN is working as a scribe for and in the presence of myself . Working as a scribe, the RN may have prepopulated components of this note with my historical intellectual property under my direct supervision. Any additions to this intellectual property were performed at my direction. Furthermore, the content and accuracy of this note have been reviewed by me to the best of my ability.    *      Bashir Mcneill MD, [address] Cardiologist  [address]  [phone] [facility]  [phone] [address] [place] [address]  1/11/2021 4:40 PM

## 2021-01-12 ENCOUNTER — HOSPITAL ENCOUNTER (OUTPATIENT)
Age: 61
Discharge: HOME | End: 2021-01-12
Payer: COMMERCIAL

## 2021-01-12 DIAGNOSIS — R00.2 PALPITATIONS: ICD-10-CM

## 2021-01-12 DIAGNOSIS — I48.0 PAF (PAROXYSMAL ATRIAL FIBRILLATION) (HCC): ICD-10-CM

## 2021-01-12 LAB — TSH REFLEX FT4: 2.53 UIU/ML (ref 0.27–4.2)

## 2021-01-12 PROCEDURE — 36415 COLL VENOUS BLD VENIPUNCTURE: CPT

## 2021-01-12 PROCEDURE — 84443 ASSAY THYROID STIM HORMONE: CPT

## 2021-01-15 ENCOUNTER — TELEPHONE (OUTPATIENT)
Dept: CARDIOLOGY CLINIC | Age: 61
End: 2021-01-15

## 2021-01-15 NOTE — TELEPHONE ENCOUNTER
----- Message from Connie Abdi MD sent at 1/13/2021  5:29 PM EST -----  Let patient know thyroid normal

## 2021-01-19 NOTE — PROGRESS NOTES
[address]   Cardiac Consultation    Date: 1/22/21  Patient Name: Rola Jara  YOB: 1960     Primary Care Physician:  Cirilo Clayton MD       Chief Complaint:   New Patient, Atrial Fibrillation, Tachycardia, and Palpitations         HPI:  Rola Jara is a 63 y.o. male who presents as a new patient for paroxysmal atrial fibrillation. He was originally evaluated in the ED on 1/13/18 for palpitations and irregular heart beat that had lasted for 5 hours. EKG 1/13/18 demonstrated AF with RVR with  BPM. He underwent successful CV for AF RVR on 1/13/18 to NSR. Echo 1/26/18 demonstrated an EF of 55% with no regional wall motion abnormalities. He wore a cardiac event monitor ([facility]) from 4/18-5/18/18 that demonstrated NSR, no AF. He was then evaluated in the ED on 11/4/20 for palpitations. EKG 11/4/20 demonstrated AF with RVR with  BPM. He converted to NSR spontaneously after 4 hours. Repeat echo was ordered but has not been completed. Today 1/22/21 he reports he is here to be evaluated for PAF. He reports he has only had two episodes of PAF in the last three years. He reports initially, the PAF episode lasted around 5 hours when he presented to the ED and underwent CV. He reports the second episode lasted around 4 hours and converted to SR spontaneously. He reports when he experienced the PAF he had tachycardia, palpitations and irregular heart beat. He reports he used a BP cuff during both episodes that demonstrated an irregular heart beat. He reports he does have sleep apnea and uses a CPAP intermittently. He reports he does not smoke. He reports he drinks very rarely. He denies any family history of AF. He reports he is active, riding an exercise bike for an hour every other day, and tolerates activity well. He reports he is taking his medications as prescribed and tolerates them well. Patient denies current edema, chest pain, sob, palpitations, dizziness or syncope. Past Medical History:   has a past medical history of Atrial fibrillation ([address] and Hypertension. Surgical History:   has no past surgical history on file. Social History:   reports that he has never smoked. He has never used smokeless tobacco. He reports current alcohol use. He reports that he does not use drugs. Family History:  family history includes Stroke in his father. Home Medications:  Outpatient Encounter Medications as of 1/22/2021   Medication Sig Dispense Refill    atenolol (TENORMIN) 50 MG tablet Take 1 tablet by mouth daily for 7 days 7 tablet 0    aspirin 325 MG tablet Take 325 mg by mouth daily       No facility-administered encounter medications on file as of 1/22/2021. Allergies:  Patient has no known allergies. Review of Systems   Constitutional: Negative. HENT: Negative. Eyes: Negative. Respiratory: Negative. Cardiovascular: Negative. Gastrointestinal: Negative. Genitourinary: Negative. Musculoskeletal: Negative. Skin: Negative. Neurological: Negative. Hematological: Negative. Psychiatric/Behavioral: Negative. /80   Pulse 68   Ht 6' 2\" (1.88 m)   Wt 208 lb (94.3 kg)   BMI 26.71 kg/m²     DATA:  ECHO: 1/26/18:  Summary   Normal left ventricle systolic function with an estimated ejection fraction   of 55%. No regional wall motion abnormalities are seen. Normal left ventricular diastolic filling pressure. MIld aortic regurgitation. [id number] Score for Atrial Fibrillation Stroke Risk   Risk   Factors  Component Value   C CHF No 0   H HTN No 0   A2 Age >= 76 No,  (61 y.o.) 0   D DM No 0   S2 Prior Stroke/TIA No 0   V Vascular Disease No 0   A Age 74-69 No,  (61 y.o.) 0   Sc Sex male 0    [id number]  Score  0   Score last updated 1/22/21 32:20 AM EST    Click here for a link to the UpToDate guideline \"Atrial Fibrillation: Anticoagulation therapy to prevent embolization    Disclaimer: Risk Score calculation is dependent on accuracy of patient problem list and past encounter diagnosis. Objective:  Physical Exam   Constitutional: He is oriented to person, place, and time. He appears well-developed and well-nourished. HENT:   Head: Normocephalic and atraumatic. Eyes: Pupils are equal, round, and reactive to light. Neck: Normal range of motion. Cardiovascular: Normal rate, regular rhythm and normal heart sounds. Pulmonary/Chest: Effort normal and breath sounds normal.   Abdominal: Soft. No tenderness. Musculoskeletal: Normal range of motion. He exhibits no edema. Neurological: He is alert and oriented to person, place, and time. Skin: Skin is warm and dry. Psychiatric: He has a normal mood and affect.      Assessment: 1. Paroxysmal Atrial Fibrillation - [id number] 0. His episodes of AF are rare, recognizable, sustained. Ultimately, the management strategy will depend on the frequency and duration of the AF episodes. For now, as needed rate control will be adequate. If his episodes of AF become more frequent, some consideration for chronic pharmacologic suppression for catheter ablation would be reasonable. Plan:  1. Discussed at length diagnosis of AF (patient education given)-will pursue as needed heart rate control at this time  2. Discussed at length treatment options for AF (patient education given)   3. Discussed anticoagulation for AF ([id number] 0, patient education given)-no indication for oral anticoagulation at this time  4. Discussed alcohol consumption and correlation to AF   5. Increase compliance with CPAP   6. Diltiazem 60 mg PRN every 6 hours as needed for PAF    - call office when this occurs   7. Continue to be active   8. Follow up with EP NP in 6 months and [facility] in 1 year     QUALITY MEASURES  1. Tobacco Cessation Counseling: NA  2. Retake of BP if >140/90:   NA  3. Documentation to PCP/referring for new patient:  Sent to PCP at close of office visit  4. CAD patient on anti-platelet: NA  5. CAD patient on STATIN therapy:  NA  6. Patient with CHF and aFib on anticoagulation:  NO    This note was scribed in the presence of Natalie Haddad MD by Orlando Velasquez. Dr. Natalie Covarrubias, personally performed the services described in this documentation as scribed by Orlando Velasquez. Flora Pichardo RN in my presence, and it is both accurate and complete.       Natalie Haddad M.D.

## 2021-01-22 ENCOUNTER — OFFICE VISIT (OUTPATIENT)
Dept: CARDIOLOGY CLINIC | Age: 61
End: 2021-01-22
Payer: COMMERCIAL

## 2021-01-22 VITALS
HEART RATE: 68 BPM | HEIGHT: 74 IN | WEIGHT: 208 LBS | BODY MASS INDEX: 26.69 KG/M2 | SYSTOLIC BLOOD PRESSURE: 120 MMHG | DIASTOLIC BLOOD PRESSURE: 80 MMHG

## 2021-01-22 DIAGNOSIS — I48.0 PAF (PAROXYSMAL ATRIAL FIBRILLATION) (HCC): Primary | ICD-10-CM

## 2021-01-22 DIAGNOSIS — R00.2 PALPITATIONS: ICD-10-CM

## 2021-01-22 PROCEDURE — G8484 FLU IMMUNIZE NO ADMIN: HCPCS | Performed by: INTERNAL MEDICINE

## 2021-01-22 PROCEDURE — 3017F COLORECTAL CA SCREEN DOC REV: CPT | Performed by: INTERNAL MEDICINE

## 2021-01-22 PROCEDURE — 99204 OFFICE O/P NEW MOD 45 MIN: CPT | Performed by: INTERNAL MEDICINE

## 2021-01-22 PROCEDURE — 93000 ELECTROCARDIOGRAM COMPLETE: CPT | Performed by: INTERNAL MEDICINE

## 2021-01-22 PROCEDURE — 1036F TOBACCO NON-USER: CPT | Performed by: INTERNAL MEDICINE

## 2021-01-22 PROCEDURE — G8427 DOCREV CUR MEDS BY ELIG CLIN: HCPCS | Performed by: INTERNAL MEDICINE

## 2021-01-22 PROCEDURE — G8419 CALC BMI OUT NRM PARAM NOF/U: HCPCS | Performed by: INTERNAL MEDICINE

## 2021-01-22 RX ORDER — DILTIAZEM HYDROCHLORIDE 60 MG/1
60 TABLET, FILM COATED ORAL DAILY PRN
Qty: 90 TABLET | Refills: 1 | Status: SHIPPED | OUTPATIENT
Start: 2021-01-22

## 2021-01-22 NOTE — PATIENT INSTRUCTIONS
Plan:  1. Discussed at length diagnosis of AF (patient education given)  2. Discussed at length treatment options for AF (patient education given)    1. Medications to slow heart rate (goal of less than 90 at rest, less than 110 with mild activity)    2. Medications for rhythm control (amiodarone, dronedarone, propafenone, flecainide, dofetilide, which requires hospital admission for three days). Discussed risks and benefits. 3. AP Study with AF Ablation to burn the abnormal electrical activity within the heart. Discussed risks and benefits. 3. Discussed anticoagulation for AF (lypbe7euqi 0, patient education given)  4. Discussed alcohol consumption and correlation to AF   5. Increase compliance with CPAP   6. Diltiazem 60 mg PRN every 6 hours as needed for PAF    - call office when this occurs, how often it occurred and length of time it lasted   7. Continue to be active   8. Follow up with EP NP in 6 months and RAVIB in 1 year     Patient Education        Atrial Fibrillation: Care Instructions  Your Care Instructions     Atrial fibrillation is an irregular and often fast heartbeat. Treating this condition is important for several reasons. It can cause blood clots, which can travel from your heart to your brain and cause a stroke. If you have a fast heartbeat, you may feel lightheaded, dizzy, and weak. An irregular heartbeat can also increase your risk for heart failure. Atrial fibrillation is often the result of another heart condition, such as high blood pressure or coronary artery disease. Making changes to improve your heart condition will help you stay healthy and active. Follow-up care is a key part of your treatment and safety. Be sure to make and go to all appointments, and call your doctor if you are having problems. It's also a good idea to know your test results and keep a list of the medicines you take. How can you care for yourself at home?   Medicines   · Take your medicines exactly as prescribed. Call your doctor if you think you are having a problem with your medicine. You will get more details on the specific medicines your doctor prescribes.     · If your doctor has given you a blood thinner to prevent a stroke, be sure you get instructions about how to take your medicine safely. Blood thinners can cause serious bleeding problems.     · Do not take any vitamins, over-the-counter drugs, or herbal products without talking to your doctor first.   Lifestyle changes    · Do not smoke. Smoking can increase your chance of a stroke and heart attack. If you need help quitting, talk to your doctor about stop-smoking programs and medicines. These can increase your chances of quitting for good.     · Eat a heart-healthy diet.     · Stay at a healthy weight. Lose weight if you need to.     · Limit alcohol to 2 drinks a day for men and 1 drink a day for women. Too much alcohol can cause health problems.     · Avoid colds and flu. Get a pneumococcal vaccine shot. If you have had one before, ask your doctor whether you need another dose. Get a flu shot every year. If you must be around people with colds or flu, wash your hands often. Activity    · If your doctor recommends it, get more exercise. Walking is a good choice. Bit by bit, increase the amount you walk every day. Try for at least 30 minutes on most days of the week. You also may want to swim, bike, or do other activities. Your doctor may suggest that you join a cardiac rehabilitation program so that you can have help increasing your physical activity safely.     · Start light exercise if your doctor says it is okay. Even a small amount will help you get stronger, have more energy, and manage stress. Walking is an easy way to get exercise. Start out by walking a little more than you did in the hospital. Gradually increase the amount you walk. Watch closely for changes in your health, and be sure to contact your doctor if you have any problems. Where can you learn more? Go to https://Clicks2Customerspepiceweb.wunderloop. org and sign in to your G3 account. Enter U020 in the [facility] box to learn more about \"Atrial Fibrillation: Care Instructions. \"     If you do not have an account, please click on the \"Sign Up Now\" link. Current as of: December 16, 2019               Content Version: 12.6  © 6540-9540 [employer] [place]). Care instructions adapted under license by [facility] ([facility]). If you have questions about a medical condition or this instruction, always ask your healthcare professional. [place] [address] any warranty or liability for your use of this information. Patient Education        Learning About Rate-Control Medicines  Introduction    Rate-control medicines are used if your heart beats too fast. These drugs can slow down your heart rate. They can also ease the symptoms of a fast heart rate. When your heart beats too fast, you may feel dizzy or pass out. You may have chest pain. Also, your heart may race or pound. These drugs can be used to treat problems such as atrial fibrillation. There are three common types of these drugs. They are beta-blockers, calcium channel blockers, and digoxin. Examples  Beta-blockers  · Atenolol (Tenormin)  · Carvedilol (Coreg)  · Metoprolol (Lopressor, Toprol)  · Propranolol (Inderal)  Calcium channel blockers  · Diltiazem (Cardizem, Taztia)  · Verapamil (Calan, Verelan)  Digoxin  · Digoxin (Lanoxin)  Possible side effects  Common side effects of beta-blockers include:  · Feeling dizzy or lightheaded. · Feeling tired. · Trouble sleeping. Common side effects of calcium channel blockers include:  · Stomach problems. You may be constipated. · Swollen legs. You may have other side effects or reactions not listed here. Check the information that comes with your medicine. What to know about taking this medicine  · Too much digoxin can poison you. This happens when there is too much of the drug in your body. Call your doctor if:  ? You lose your appetite. ? You have stomach problems. You may feel sick to your stomach. Or you may vomit or have diarrhea.  ? Your vision changes. ? You are confused. ? Your heartbeat is not normal.  ? You passed out. · Take your medicines exactly as prescribed. Call your doctor if you think you are having a problem with your medicine. · Check with your doctor or pharmacist before you use any other medicines. These include over-the-counter medicines. Make sure your doctor knows all of the medicines, vitamins, herbs, and supplements you take. Taking some medicines together can cause problems. Where can you learn more? Go to https://chkevin.Fugate.cl. org and sign in to your [facility] account. Enter M711 in the [employer] box to learn more about \"Learning About Rate-Control Medicines. \"     If you do not have an account, please click on the \"Sign Up Now\" link. Current as of: December 16, 2019               Content Version: 12.6  © 7874-8393 [employer]. Care instructions adapted under license by [facility] ([facility]). If you have questions about a medical condition or this instruction, always ask your healthcare professional. [place] [address] any warranty or liability for your use of this information. Patient Education        Learning About Rhythm-Control Medicines  Introduction    Rhythm-control medicines return your heart to a normal rhythm. And they keep it at a normal rhythm. They are also called antiarrhythmics. Doctors may use these medicines for many reasons. These reasons include:  · You have symptoms from a heart rhythm problem. · You had electric cardioversion. Or you will have it. · You had catheter ablation. · You tried medicine to control your heart rate. But it did not help. · You are at risk of having a dangerous heart rhythm. These medicines can have serious side effects. Examples  · Amiodarone (Pacerone)  · Dofetilide (Tikosyn)  · Propafenone (Rythmol)  · Sotalol (Betapace AF)  Possible side effects  Side effects depend on which medicine you take. One serious one is a very irregular heart rate. Read the information that comes with your medicine. What to know about taking this medicine  · You may be in the hospital when you start this medicine. Your doctor will watch what it does to your heart. · Be sure to ask your doctor any questions. You may want to know more about the pros and cons of the medicine. · Your doctor will check you often. He or she will make sure you are not having problems. Be sure to go to all of your visits. · You may need regular blood tests. These make sure you are taking the right amount of medicine. · Take your medicines exactly as prescribed. Call your doctor if you think you are having a problem with your medicine. · Check with your doctor or pharmacist before you use any other medicines. This includes over-the-counter medicines. Make sure your doctor knows all of the medicines, vitamins, herbal products, and supplements you take. Taking some medicines together can cause problems. Where can you learn more? Go to https://KSY Corporationkevin.Circle Technology. org and sign in to your [employer] account. Enter M380 in the [facility] box to learn more about \"Learning About Rhythm-Control Medicines. \"     If you do not have an account, please click on the \"Sign Up Now\" link. Current as of: December 16, 2019               Content Version: 12.6  © 6622-2107 [employer]. Care instructions adapted under license by [facility]. If you have questions about a medical condition or this instruction, always ask your healthcare professional. [address] any warranty or liability for your use of this information. Patient Education        Electrophysiology Study and Catheter Ablation: Before Your Procedure  What is an electrophysiology study and catheter ablation? An electrophysiology study is a test to see if there is a problem with your heart rhythm and to find out how to fix it. It is also called an EP study. A catheter ablation procedure is sometimes done at the same time. This procedure destroys (ablates) small areas of your heart that are causing your heart rhythm problem. The doctor puts plastic tubes called catheters into blood vessels in your groin, arm, or neck. He or she then uses an X-ray machine to guide long wires through the tubes to your heart. The doctor can use these wires to record your heart's electrical signals. If the doctor thinks your problem can be fixed with ablation, he or she can use the wires to destroy a small part of your heart tissue. This is most often done with radio waves. You will probably be awake during the procedure. But you might be asleep. The doctor will give you medicines to help you feel relaxed and to numb the areas where the catheters go in. An EP study and ablation can take 2 to 6 hours. In rare cases, it can take longer. If you have an EP study only and you don't need more treatment, you may go home the same day. But if you also have ablation, you may stay overnight in the hospital. How long you stay in the hospital depends on the type of ablation you have. Do not exercise hard or lift anything heavy for a week. You may be able to go back to work and to your normal routine in 1 or 2 days. Follow-up care is a key part of your treatment and safety. Be sure to make and go to all appointments, and call your doctor if you are having problems. It's also a good idea to know your test results and keep a list of the medicines you take. How do you prepare for the procedure? Procedures can be stressful. This information will help you understand what you can expect. And it will help you safely prepare for your procedure. Preparing for the procedure    · Be sure you have someone to take you home. Anesthesia and pain medicine will make it unsafe for you to drive or get home on your own.     · Understand exactly what procedure is planned, along with the risks, benefits, and other options.     · Tell your doctor ALL the medicines, vitamins, supplements, and herbal remedies you take. Some may increase the risk of problems during your procedure. Your doctor will tell you if you should stop taking any of them before the procedure and how soon to do it.     · If you take aspirin or some other blood thinner, ask your doctor if you should stop taking it before your procedure. Make sure that you understand exactly what your doctor wants you to do. These medicines increase the risk of bleeding.     · Make sure your doctor and the hospital have a copy of your advance directive. If you don't have one, you may want to prepare one. It lets others know your health care wishes. It's a good thing to have before any type of surgery or procedure. What happens on the day of the procedure? · Follow the instructions exactly about when to stop eating and drinking. If you don't, your procedure may be canceled. If your doctor told you to take your medicines on the day of the procedure, take them with only a sip of water.     · Take a bath or shower before you come in for your procedure. Do not apply lotions, perfumes, deodorants, or nail polish.   · Take off all jewelry and piercings. And take out contact lenses, if you wear them. At the hospital or surgery center   · Bring a picture ID.     · You will be kept comfortable and safe by your anesthesia provider. The anesthesia may make you sleep. Or it may just numb the area being worked on.     · This procedure can take 2 to 6 hours. In rare cases, it can take longer.     · After the procedure, pressure will be applied to the area where the catheter was put in your blood vessel. Then the area may be covered with a bandage or a compression device. This will prevent bleeding.     · Nurses will check your heart rate and blood pressure. The nurse will also check the catheter site for bleeding.     · If the catheter was put in your groin, you will need to lie still and keep your leg straight for several hours. The nurse may put a weighted bag on your leg to keep it still.     · If the catheter was put in your arm, you may be able to sit up and get out of bed right away. But you will need to keep your arm still for at least 1 hour.     · You may have a bruise or a small lump where the catheter was put in your blood vessel. This is normal and will go away. When should you call your doctor? · You have questions or concerns.     · You don't understand how to prepare for your procedure.     · You become ill before the procedure (such as fever, flu, or a cold).     · You need to reschedule or have changed your mind about having the procedure. Where can you learn more? Go to https://Sun NumberpePostRocket.Technorides. org and sign in to your [employer] account. Enter K609 in the [facility] box to learn more about \"Electrophysiology Study and Catheter Ablation: Before Your Procedure. \"     If you do not have an account, please click on the \"Sign Up Now\" link. Current as of: December 16, 2019               Content Version: 12.6  © 8311-0842 Mighty[employer]iz, Incorporated. Care instructions adapted under license by [facility] ([facility]). If you have questions about a medical condition or this instruction, always ask your healthcare professional. [address] any warranty or liability for your use of this information. Patient Education        Electrophysiology Study and Catheter Ablation: What to Expect at [address] [place] [address] Drive had an electrophysiology study for a problem with your heartbeat. You may also have had a catheter ablation to try to correct the problem. You may have swelling, bruising, or a small lump around the site where the catheters went into your body. These should go away in 3 to 4 weeks. Do not exercise hard or lift anything heavy for a week. You may be able to go back to work and to your normal routine in 1 or 2 days. This care sheet gives you a general idea about how long it will take for you to recover. But each person recovers at a different pace. Follow the steps below to get better as quickly as possible. How can you care for yourself at home? Activity    · For 1 week, do not lift anything that would make you strain. This may include heavy grocery bags and milk containers, a heavy briefcase or backpack, cat litter or dog food bags, a vacuum , or a child.     · For 1 week, do not exercise hard or do any activity that could strain your blood vessels or the site where the catheters went into your body.     · Ask your doctor when it is okay to have sex. Diet    · You can eat your normal diet. If your stomach is upset, try bland, low-fat foods like plain rice, broiled chicken, toast, and yogurt.     · Drink plenty of fluids (unless your doctor tells you not to). Medicines    · Your doctor will tell you if and when you can restart your medicines. He or she will also give you instructions about taking any new medicines.   · If you take aspirin or some other blood thinner, ask your doctor if and when to start taking it again. Make sure that you understand exactly what your doctor wants you to do.     · Ask your doctor if you can take acetaminophen (Tylenol) for pain. Do not take aspirin for 3 days, unless your doctor says it is okay.     · Check with your doctor before you take aspirin or anti-inflammatory medicines to reduce pain and swelling. These include ibuprofen (Advil, Motrin) and naproxen (Aleve).   · Make sure you know which heart medicines to continue and which ones to stop. Ask your doctor if you aren't sure. Catheter site care    · You can remove your bandages the day after the procedure.     · You may shower 24 to 48 hours after the procedure, if your doctor okays it. Pat the incision dry.     · Do not soak the catheter site until it is healed. Don't take a bath for 1 week, or until your doctor tells you it is okay.     · Watch for bleeding from the site. A small amount of blood (up to the size of a quarter) on the bandage can be normal.     · If you are bleeding, lie down and press on the area for 15 minutes to try to make it stop. If the bleeding does not stop, call your doctor or seek immediate medical care. Follow-up care is a key part of your treatment and safety. Be sure to make and go to all appointments, and call your doctor if you are having problems. It's also a good idea to know your test results and keep a list of the medicines you take. When should you call for help? Call  911 anytime you think you may need emergency care. For example, call if:    · You passed out (lost consciousness).     · You have symptoms of a heart attack. These may include:  ? Chest pain or pressure, or a strange feeling in the chest.  ? Sweating. ? Shortness of breath. ? Nausea or vomiting. ? Pain, pressure, or a strange feeling in the back, neck, jaw, or upper belly, or in one or both shoulders or arms. ? Lightheadedness or sudden weakness. ? A fast or irregular heartbeat. After you call 911, the  may tell you to chew 1 adult-strength or 2 to 4 low-dose aspirin. Wait for an ambulance. Do not try to drive yourself.     · You have symptoms of a stroke. These may include:  ? Sudden numbness, tingling, weakness, or loss of movement in your face, arm, or leg, especially on only one side of your body. ? Sudden vision changes. ? Sudden trouble speaking. ? Sudden confusion or trouble understanding simple statements. ? Sudden problems with walking or balance. ? A sudden, severe headache that is different from past headaches. Call your doctor now or seek immediate medical care if:    · You are bleeding from the area where the catheter was put in your blood vessel.     · You have a fast-growing, painful lump at the catheter site.     · You have signs of infection, such as:  ? Increased pain, swelling, warmth, or redness. ? Red streaks leading from the catheter site. ? Pus draining from the catheter site. ? A fever.     · Your leg, arm, or hand is painful, looks blue, or feels cold, numb, or tingly. Watch closely for any changes in your health, and be sure to contact your doctor if you have any problems. Where can you learn more? Go to https://SEEC ABpePaytrail.Fusion Telecommunications. org and sign in to your Tagora account. Enter [phone] in the [facility] box to learn more about \"Electrophysiology Study and Catheter Ablation: What to Expect at Home. \"     If you do not have an account, please click on the \"Sign Up Now\" link. Current as of: December 16, 2019               Content Version: 12.6  © 7822-6558 RPost, Incorporated. Care instructions adapted under license by [facility] ([facility]). If you have questions about a medical condition or this instruction, always ask your healthcare professional. [place] [address] any warranty or liability for your use of this information. Patient Education        Taking Direct Oral Anticoagulants Safely: Care Instructions  Your Care Instructions     Direct oral anticoagulants (DOACs) are medicines that help prevent blood clots. They also help treat problems caused by blood clots. These medicines are also called blood thinners. Blood thinners don't really thin the blood. They slow down the time it takes for a blood clot to form. They also keep existing blood clots from getting bigger. Blood thinners can help prevent a stroke caused by a heart rhythm problem (atrial fibrillation). This heart rhythm problem can form clots in the heart that can then go to the brain. Blood thinners can also help prevent or treat blood clots in the legs or lungs. Examples of DOACs include:  · Apixaban (Eliquis). · Dabigatran (Pradaxa). · Edoxaban (Savaysa). · Rivaroxaban (Xarelto). Blood thinners can help save lives. But they can also cause problems. They can make you more likely to bleed. It's important to take them right and do everything you can to keep yourself safe. Follow-up care is a key part of your treatment and safety. Be sure to make and go to all appointments, and call your doctor if you are having problems. It's also a good idea to know your test results and keep a list of the medicines you take. How can you care for yourself at home? · Take your anticoagulant (blood thinner) exactly as your doctor prescribed them. · If you miss a dose, don't take an extra dose to make up for it. Your doctor can tell you exactly what to do so you don't take too much or too little. · Ask your pharmacist if you should take your blood thinner with food or without food. · Take your blood thinner at the same time every day. Be careful not to run out of them. · Ask your pharmacist how to store your blood thinner. · Don't take other medicines before talking to your doctor or pharmacist first. This includes prescription medicines, over-the-counter medicines, vitamins, and herbal products. It also includes aspirin and other pain relievers, such as ibuprofen (Motrin). · Tell your doctors, dentist, and pharmacist that you are taking a blood thinner. · Wear medical alert jewelry. This lets others know that you take a blood thinner. You can buy it at most drugstores. · If you are pregnant, breastfeeding, or trying to get pregnant, tell your doctor. You and your doctor will decide what medicines are safe. If you are not planning on getting pregnant, talk to your doctor about how you can prevent pregnancy. · Try to avoid injuries. For example, be careful when you are exercising or playing sports. Make your home safe to reduce your risk of falling. When should you call for help? Call 911 anytime you think you may need emergency care. For example, call if:    · You have a sudden, severe headache that is different from past headaches. Call your doctor now or seek immediate medical care if:    · You have any abnormal bleeding, such as:  ? Nosebleeds. ? Vaginal bleeding that is different (heavier, more frequent, at a different time of the month) than what you are used to.  ? Bloody or black stools, or rectal bleeding. ? Bloody or pink urine. Watch closely for changes in your health, and be sure to contact your doctor if you have any problems. Where can you learn more? Go to [email or web address] and sign in to your [employer] account. Enter I256 in the [facility] box to learn more about \"Taking Direct Oral Anticoagulants Safely: Care Instructions. \"     If you do not have an account, please click on the \"Sign Up Now\" link. Current as of: December 16, 2019               Content Version: 12.6  © 8712-2305 [employer]. Care instructions adapted under license by [facility] ([facility]). If you have questions about a medical condition or this instruction, always ask your healthcare professional. [address] any warranty or liability for your use of this information.

## 2021-02-03 ENCOUNTER — HOSPITAL ENCOUNTER (OUTPATIENT)
Dept: NON INVASIVE DIAGNOSTICS | Age: 61
Discharge: HOME | End: 2021-02-03
Payer: COMMERCIAL

## 2021-02-03 DIAGNOSIS — I35.1 NONRHEUMATIC AORTIC VALVE INSUFFICIENCY: ICD-10-CM

## 2021-02-03 DIAGNOSIS — R00.2 PALPITATIONS: ICD-10-CM

## 2021-02-03 LAB
LV EF: 55 %
LVEF MODALITY: NORMAL

## 2021-02-03 PROCEDURE — 93306 TTE W/DOPPLER COMPLETE: CPT

## 2021-02-04 ENCOUNTER — TELEPHONE (OUTPATIENT)
Dept: CARDIOLOGY CLINIC | Age: 61
End: 2021-02-04

## 2021-02-04 NOTE — TELEPHONE ENCOUNTER
----- Message from Moises Melchor MD sent at 2/3/2021  3:58 PM EST -----  Patient know his echo was normal did show some mild regurgitation which is not leading to symptoms. We could was repeat his echo in 2 to 5 years for this.   I would still like him to follow-up with EP for his paroxysmal atrial fibrillation

## 2021-07-16 ENCOUNTER — OFFICE VISIT (OUTPATIENT)
Dept: CARDIOLOGY CLINIC | Age: 61
End: 2021-07-16
Payer: COMMERCIAL

## 2021-07-16 VITALS
HEART RATE: 67 BPM | HEIGHT: 74 IN | DIASTOLIC BLOOD PRESSURE: 64 MMHG | OXYGEN SATURATION: 98 % | SYSTOLIC BLOOD PRESSURE: 110 MMHG | BODY MASS INDEX: 27.14 KG/M2 | WEIGHT: 211.5 LBS

## 2021-07-16 DIAGNOSIS — I48.0 PAROXYSMAL ATRIAL FIBRILLATION (HCC): Primary | ICD-10-CM

## 2021-07-16 DIAGNOSIS — I49.9 IRREGULAR HEART RATE: ICD-10-CM

## 2021-07-16 PROBLEM — Z86.010 PERSONAL HISTORY OF COLONIC POLYPS: Status: ACTIVE | Noted: 2020-11-06

## 2021-07-16 PROBLEM — Z86.0100 PERSONAL HISTORY OF COLONIC POLYPS: Status: ACTIVE | Noted: 2020-11-06

## 2021-07-16 PROBLEM — G47.33 OSA ON CPAP: Status: ACTIVE | Noted: 2018-04-05

## 2021-07-16 PROBLEM — Z99.89 OSA ON CPAP: Status: ACTIVE | Noted: 2018-04-05

## 2021-07-16 PROCEDURE — 99213 OFFICE O/P EST LOW 20 MIN: CPT | Performed by: NURSE PRACTITIONER

## 2021-07-16 PROCEDURE — 93000 ELECTROCARDIOGRAM COMPLETE: CPT | Performed by: NURSE PRACTITIONER

## 2021-07-16 NOTE — LETTER
[facility]   Electrophysiology Outpatient Note              Date:  July 16, 2021  Patient name: Jp Kilpatrick  YOB: 1960    Primary Care physician: Tony Murillo MD    HISTORY OF PRESENT ILLNESS: The patient is a 57 y.o.  male with a history of AF and NATALYA. In 1/2018, he was evaluated in the ED for palpitations. EKG showed AF with RVR. He underwent successful DCCV. Echo showed an EF of 55%. In 4/2018, he wore a thirty day monitor that showed NSR and no AF. In 11/2020, he was evaluated in the ED for palpitations. EKG showed AF with RVR. Today he is being seen for AF. EKG shows SB with a heart rate of 56. He has no complaints. He has not needed to take any PRN Cardizem. Feels that e has not had any recurrence of AF. He wears CPAP occasionally. Denies chest pain, palpitations, shortness of breath, and dizziness. Past Medical History:   has a past medical history of Atrial fibrillation ([address] and Hypertension. Past Surgical History:   has no past surgical history on file. Home Medications:    Prior to Admission medications    Medication Sig Start Date End Date Taking? Authorizing Provider   dilTIAZem (CARDIZEM) 60 MG tablet Take 1 tablet by mouth daily as needed (every 6 hours as needed for paroxysmal atrial fibrillation) 1/22/21  Yes January Franks MD   atenolol (TENORMIN) 50 MG tablet Take 1 tablet by mouth daily for 7 days 1/1/19 7/16/21 Yes Gonzalez Mckeon MD   aspirin 325 MG tablet Take 325 mg by mouth daily   Yes Historical Provider, MD       Allergies:  Patient has no known allergies. Social History:   reports that he has never smoked. He has never used smokeless tobacco. He reports current alcohol use. He reports that he does not use drugs. Family History: family history includes Stroke in his father. All 14 point review of systems are completed and pertinent positives are mentioned in the history of present illness.  Other systems are reviewed and are negative. PHYSICAL EXAM:    Vital signs:    /64   Pulse 67   Ht 6' 2\" (1.88 m)   Wt 211 lb 8 oz (95.9 kg)   SpO2 98%   BMI 27.15 kg/m²      Constitutional and general appearance: alert, cooperative, no distress and appears stated age  HEENT: PERRL, no cervical lymphadenopathy. No masses palpable. Normal oral mucosa  Respiratory:  · Normal excursion and expansion without use of accessory muscles  · Resp auscultation: Normal breath sounds without wheezing, rhonchi, and rales  Cardiovascular:  · The apical impulse is not displaced  · Heart tones are crisp and normal. regular S1 and S2.  · Jugular venous pulsation Normal  · The carotid upstroke is normal in amplitude and contour without delay or bruit  · Peripheral pulses are symmetrical and full   Abdomen:  · No masses or tenderness  · Bowel sounds present  Extremities:  ·  No cyanosis or clubbing  ·  No lower extremity edema  ·  Skin: warm and dry  Neurological:  · Alert and oriented  · Moves all extremities well  · No abnormalities of mood, affect, memory, mentation, or behavior are noted    DATA:    ECG 7/16/2021:  SB 56 bpm    Echo 2/3/2021:  Summary   -- Left ventricular systolic function is normal with a visually estimated   ejection fraction of 55%. The left ventricle is normal in size with normal   wall thickness. No regional wall motion abnormalities are noted. Normal left   ventricular diastolic function. -- The aortic valve appears sclerotic but opens well. Mild aortic   regurgitation is present. -- Inadequate tricuspid valve regurgitation to estimate systolic pulmonary   artery pressure. Echo 1/26/2018:   Summary   Normal left ventricle systolic function with an estimated ejection fraction   of 55%. No regional wall motion abnormalities are seen. Normal left ventricular diastolic filling pressure. MIld aortic regurgitation. All labs and testing reviewed.   CARDIOLOGY LABS:   CBC: No results for input(s): WBC, HGB, HCT, PLT in the last 72 hours. BMP: No results for input(s): NA, K, CO2, BUN, CREATININE, LABGLOM, GLUCOSE in the last 72 hours. PT/INR: No results for input(s): PROTIME, INR in the last 72 hours. APTT:No results for input(s): APTT in the last 72 hours. FASTING LIPID PANEL:  Lab Results   Component Value Date    HDL 41 05/31/2018    LDLCALC 144 05/31/2018    TRIG 236 05/31/2018     LIVER PROFILE:No results for input(s): AST, ALT, ALB in the last 72 hours.     IMPRESSION:    Assessment:   Paroxsymal atrial fibrillation: stable   -[id number] score 0  NATALYA: inconsistent CPAP use   Aortic insufficiency: mild on echo 2018    Plan:   Continue PRN Cardizem   Recommend consistent CPAP use  No AC at this time given brief/rare nature of AF as well as [id number] score 0  Follow up in 6 months with Dr. Meagan Saunders     MDM low        Glasford Chol, APRN-CNP  [facility], [place], [facility]  [phone]

## 2021-07-16 NOTE — PROGRESS NOTES
are reviewed and are negative. PHYSICAL EXAM:    Vital signs:    /64   Pulse 67   Ht 6' 2\" (1.88 m)   Wt 211 lb 8 oz (95.9 kg)   SpO2 98%   BMI 27.15 kg/m²      Constitutional and general appearance: alert, cooperative, no distress and appears stated age  HEENT: PERRL, no cervical lymphadenopathy. No masses palpable. Normal oral mucosa  Respiratory:  · Normal excursion and expansion without use of accessory muscles  · Resp auscultation: Normal breath sounds without wheezing, rhonchi, and rales  Cardiovascular:  · The apical impulse is not displaced  · Heart tones are crisp and normal. regular S1 and S2.  · Jugular venous pulsation Normal  · The carotid upstroke is normal in amplitude and contour without delay or bruit  · Peripheral pulses are symmetrical and full   Abdomen:  · No masses or tenderness  · Bowel sounds present  Extremities:  ·  No cyanosis or clubbing  ·  No lower extremity edema  ·  Skin: warm and dry  Neurological:  · Alert and oriented  · Moves all extremities well  · No abnormalities of mood, affect, memory, mentation, or behavior are noted    DATA:    ECG 7/16/2021:  SB 56 bpm    Echo 2/3/2021:  Summary   -- Left ventricular systolic function is normal with a visually estimated   ejection fraction of 55%. The left ventricle is normal in size with normal   wall thickness. No regional wall motion abnormalities are noted. Normal left   ventricular diastolic function. -- The aortic valve appears sclerotic but opens well. Mild aortic   regurgitation is present. -- Inadequate tricuspid valve regurgitation to estimate systolic pulmonary   artery pressure. Echo 1/26/2018:   Summary   Normal left ventricle systolic function with an estimated ejection fraction   of 55%. No regional wall motion abnormalities are seen. Normal left ventricular diastolic filling pressure. MIld aortic regurgitation. All labs and testing reviewed.   CARDIOLOGY LABS:   CBC: No results for input(s): WBC, HGB, HCT, PLT in the last 72 hours. BMP: No results for input(s): NA, K, CO2, BUN, CREATININE, LABGLOM, GLUCOSE in the last 72 hours. PT/INR: No results for input(s): PROTIME, INR in the last 72 hours. APTT:No results for input(s): APTT in the last 72 hours. FASTING LIPID PANEL:  Lab Results   Component Value Date    HDL 41 05/31/2018    LDLCALC 144 05/31/2018    TRIG 236 05/31/2018     LIVER PROFILE:No results for input(s): AST, ALT, ALB in the last 72 hours.     IMPRESSION:    Assessment:   Paroxsymal atrial fibrillation: stable   -[id number] score 0  NATALYA: inconsistent CPAP use   Aortic insufficiency: mild on echo 2018    Plan:   Continue PRN Cardizem   Recommend consistent CPAP use  No AC at this time given brief/rare nature of AF as well as [id number] score 0  Follow up in 6 months with Dr. Katarzyna Cody     [facility] jacquelyn Flores, OSWALDO-CNP  [address] [facility] [address]  [phone]

## 2022-12-21 ENCOUNTER — OFFICE VISIT (OUTPATIENT)
Dept: CARDIOLOGY CLINIC | Age: 62
End: 2022-12-21
Payer: COMMERCIAL

## 2022-12-21 VITALS
BODY MASS INDEX: 26.72 KG/M2 | OXYGEN SATURATION: 99 % | HEART RATE: 57 BPM | WEIGHT: 208.2 LBS | HEIGHT: 74 IN | DIASTOLIC BLOOD PRESSURE: 82 MMHG | SYSTOLIC BLOOD PRESSURE: 138 MMHG

## 2022-12-21 DIAGNOSIS — I48.0 PAF (PAROXYSMAL ATRIAL FIBRILLATION) (HCC): Primary | ICD-10-CM

## 2022-12-21 PROCEDURE — 1036F TOBACCO NON-USER: CPT | Performed by: INTERNAL MEDICINE

## 2022-12-21 PROCEDURE — 93000 ELECTROCARDIOGRAM COMPLETE: CPT | Performed by: INTERNAL MEDICINE

## 2022-12-21 PROCEDURE — 99214 OFFICE O/P EST MOD 30 MIN: CPT | Performed by: INTERNAL MEDICINE

## 2022-12-21 PROCEDURE — 3017F COLORECTAL CA SCREEN DOC REV: CPT | Performed by: INTERNAL MEDICINE

## 2022-12-21 PROCEDURE — G8427 DOCREV CUR MEDS BY ELIG CLIN: HCPCS | Performed by: INTERNAL MEDICINE

## 2022-12-21 PROCEDURE — G8419 CALC BMI OUT NRM PARAM NOF/U: HCPCS | Performed by: INTERNAL MEDICINE

## 2022-12-21 PROCEDURE — G8484 FLU IMMUNIZE NO ADMIN: HCPCS | Performed by: INTERNAL MEDICINE

## 2022-12-21 NOTE — Clinical Note
[address] [address]  Phone: [phone]  Fax: [phone]    Tyler Yanez MD        December 23, 2022     Patient: Renetta Ha   YOB: 1960   Date of Visit: 12/21/2022       To Whom It May Concern: It is my medical opinion that Marla Carpenterjason {Work release (duty restriction):68620}. If you have any questions or concerns, please don't hesitate to call.     Sincerely,        Tyler Yanez MD

## 2022-12-21 NOTE — PATIENT INSTRUCTIONS
Plan:  1. Instructed to monitor heart rate and report abnormal findings to office  2. Instructed to use Diltiazem as needed  3.  Follow up in 1 year with EP NP

## 2022-12-21 NOTE — Clinical Note
[address] [address]  Phone: [phone]  Fax: [phone]    Ros Parks MD    December 23, 2022     Adilia Purvis MD  No address on file    Patient: Maci Verdugo   MR Number: [id number]   YOB: 1960   Date of Visit: 12/21/2022       Dear Adilia Purvis:    Thank you for referring Gamaliel Montes to me for evaluation/treatment. Below are the relevant portions of my assessment and plan of care. If you have questions, please do not hesitate to call me. I look forward to following Magdaleno Lord along with you.     Sincerely,      Ros Parks MD

## 2022-12-21 NOTE — PROGRESS NOTES
[facility]   Cardiac Consultation    Date: 12/21/22  Patient Name: Cecilia Burrell  YOB: 1960     Primary Care Physician:  Jennifer Rushing MD       Chief Complaint:   Follow-up and Atrial Fibrillation       HPI:  Cecilia Burrell is a 60 y.o. male who presents as a new patient for paroxysmal atrial fibrillation. He was originally evaluated in the ED on 1/13/18 for palpitations and irregular heart beat that had lasted for 5 hours. EKG 1/13/18 demonstrated AF with RVR with  BPM. He underwent successful CV for AF RVR on 1/13/18 to NSR. Echo 1/26/18 demonstrated an EF of 55% with no regional wall motion abnormalities. He wore a cardiac event monitor ([employer]) from 4/18-5/18/18 that demonstrated NSR, no AF. He was then evaluated in the ED on 11/4/20 for palpitations. EKG 11/4/20 demonstrated AF with RVR with  BPM. He converted to NSR spontaneously after 4 hours. Repeat echo was ordered but has not been completed. At patient's office visit 1/22/2021, he reported he has only had two episodes of PAF in the last three years. He reported initially, the PAF episode lasted around 5 hours when he presented to the ED and underwent CV. He reported the second episode lasted around 4 hours and converted to SR spontaneously. He reported when he experienced the PAF he had tachycardia, palpitations and irregular heart beat. He reported he used a BP cuff during both episodes that demonstrated an irregular heart beat. He reported he does have sleep apnea and uses a CPAP intermittently. Patient was started on diltiazem PRN at the conclusion of 1/2021 office visit. Echo 2/2021 demonstrated an LVEF of 55%. Today, 12/21/2022 patient reports feeling overall well. He reports he hasn't had any recent episodes of PAF. He is certain he would recognize a recurrence. He monitors his heart rate through his blood pressure machine at home. He notes that when he is in AF his HR is elevated.  He has not had to use PRN diltiazem. Patient denies chest pain, sob, palpitations, dizziness or syncope. Past Medical History:   has a past medical history of Atrial fibrillation ([address] and Hypertension. Surgical History:   has no past surgical history on file. Social History:   reports that he has never smoked. He has never used smokeless tobacco. He reports current alcohol use. He reports that he does not use drugs. Family History:  family history includes Stroke in his father. Home Medications:  Outpatient Encounter Medications as of 12/21/2022   Medication Sig Dispense Refill    dilTIAZem (CARDIZEM) 60 MG tablet Take 1 tablet by mouth daily as needed (every 6 hours as needed for paroxysmal atrial fibrillation) 90 tablet 1    atenolol (TENORMIN) 50 MG tablet Take 1 tablet by mouth daily for 7 days 7 tablet 0    aspirin 325 MG tablet Take 325 mg by mouth daily       No facility-administered encounter medications on file as of 12/21/2022. Allergies:  Patient has no known allergies. Review of Systems   Constitutional: Negative. HENT: Negative. Eyes: Negative. Respiratory: Negative. Cardiovascular: Negative. Gastrointestinal: Negative. Genitourinary: Negative. Musculoskeletal: Negative. Skin: Negative. Neurological: Negative. Hematological: Negative. Psychiatric/Behavioral: Negative. /82   Pulse 57   Ht 6' 2\" (1.88 m)   Wt 208 lb 3.2 oz (94.4 kg)   SpO2 99%   BMI 26.73 kg/m²     DATA:    ECG 12/21/22: Personally reviewed. All current cardiac medications reviewed and adjusted accordingly  12/21/22    ECHO 2/2021:   Summary   -- Left ventricular systolic function is normal with a visually estimated   ejection fraction of 55%. The left ventricle is normal in size with normal   wall thickness. No regional wall motion abnormalities are noted. Normal left   ventricular diastolic function. -- The aortic valve appears sclerotic but opens well.  Mild aortic   regurgitation is present. -- Inadequate tricuspid valve regurgitation to estimate systolic pulmonary   artery pressure. ECHO: 1/26/18:  Summary   Normal left ventricle systolic function with an estimated ejection fraction   of 55%. No regional wall motion abnormalities are seen. Normal left ventricular diastolic filling pressure. MIld aortic regurgitation. [id number] Score for Atrial Fibrillation Stroke Risk   Risk   Factors  Component Value   C CHF No 0   H HTN No 0   A2 Age >= 76 No,  (63 y.o.) 0   D DM No 0   S2 Prior Stroke/TIA No 0   V Vascular Disease No 0   A Age 74-69 No,  (63 y.o.) 0   Sc Sex male 0    [id number]  Score  0   Score last updated 1/22/21 85:92 AM EST    Click here for a link to the UpToDate guideline \"Atrial Fibrillation: Anticoagulation therapy to prevent embolization    Disclaimer: Risk Score calculation is dependent on accuracy of patient problem list and past encounter diagnosis. Objective:  Physical Exam   Constitutional: He is oriented to person, place, and time. He appears well-developed and well-nourished. HENT:   Head: Normocephalic and atraumatic. Eyes: Pupils are equal, round, and reactive to light. Neck: Normal range of motion. Cardiovascular: Normal rate, regular rhythm and normal heart sounds. Pulmonary/Chest: Effort normal and breath sounds normal.   Abdominal: Soft. No tenderness. Musculoskeletal: Normal range of motion. He exhibits no edema. Neurological: He is alert and oriented to person, place, and time. Skin: Skin is warm and dry. Psychiatric: He has a normal mood and affect. Assessment:  1. Paroxysmal Atrial Fibrillation - [id number] 0. His episodes of AF are rare, recognizable, sustained. Ultimately, the management strategy will depend on the frequency and duration of the AF episodes. For now, as needed rate control will be adequate.   If his episodes of AF become more frequent, some consideration for chronic pharmacologic suppression for catheter ablation would be reasonable. 12/21/2022 office visit patient reported no recurrence. Plan:  1. Instructed to monitor heart rate and report abnormal findings to office  2. Instructed to use Diltiazem as needed  3. Follow up in 1 year with EP NP      QUALITY MEASURES  1. Tobacco Cessation Counseling: NA  2. Retake of BP if >140/90:   NA  3. Documentation to PCP/referring for new patient:  Sent to PCP at close of office visit  4. CAD patient on anti-platelet: NA  5. CAD patient on STATIN therapy:  NA  6. Patient with CHF and aFib on anticoagulation:  NO    This note has been scribed in the presence of Adina Friedman MD, by Lroa Montoya RN.     I, Dr. Adina Friedman, personally performed the services described in this documentation as scribed by Lora Montoya RN in my presence, and it is both accurate and complete.        Adina Friedman M.D.

## 2025-01-14 ENCOUNTER — PREP FOR PROCEDURE (OUTPATIENT)
Dept: SURGERY | Age: 65
End: 2025-01-14

## 2025-01-14 ENCOUNTER — TELEPHONE (OUTPATIENT)
Dept: SURGERY | Age: 65
End: 2025-01-14

## 2025-01-14 DIAGNOSIS — C61 PROSTATE CANCER (HCC): ICD-10-CM

## 2025-01-14 NOTE — PROGRESS NOTES
Yousif MILLER Simunek    Age 64 y.o.    male    1960    MRN [id number]    1/17/2025  Arrival Time_____________  OR Time____________65 Min     Procedure(s):  PORT INSERTION                      Monitor Anesthesia Care   Surgeon(s):  Jose Nava, MD      DAY ADMIT ___  SDS/OP ___  OUTPT IN BED ___        Phone [phone] (home)                  PCP _____________________ Phone_________________ Epic ( ) Epic CE ( ) Appt ________    NOTES: _________________________________________ Consult/Cardio _______________    ____________________________________________________________________________    ____________________________________________________________________________  PAT APPT DATE:________ TIME: ________  FAXED QAD: _______  (__) H&P w/ Hospitalist    (__) PAT orders in EPIC    (__) Meet with PAT nurse  __________________________________________________________________________  Preop Nurse phone screen complete: _____________  (__) CBC     (__) W/ DIFF ___________  (__) CT CHEST  __________   (__) Hgb A1C    ___________  (__) CHEST X RAY   __________  (__) LIPID PROFILE  ___________  (__) EKG   __________  (__) PT-INR / APTT  ___________  (__) PFT's   __________  (__) BMP   ___________  (__) CAROTIDS  __________  (__) CMP   ___________  (__) VEIN MAPPING  __________  (__) U/A   ___________  ( X ) HISTORY & PHYSICAL __________  (__) URINE C & S  ___________  (__) CARDIAC CLEARANCE __________  (__) U/A W/ FLEX  ___________  (__) PULM. CLEARANCE __________  (__) SERUM PREGNANCY ___________  (__) Preop Orders in EPIC __________  (__) TYPE & SCREEN __________repeat ( ) (__)  __________________ __________  (__) Albumin   ___________  (__)  __________________ __________  (__) TRANSFERRIN  ___________  (__)  __________________ __________  (__) LIVER PROFILE  ___________  (__) URINE PREG DOS __________  (__) MRSA NASAL SWAB ___________  (__) BLOOD SUGAR DOS __________  (__) SED RATE  ___________  (__) OAC   _________________________  (__) C-REACTIVE PROTEIN ___________    (__) VITAMIN D HYDROXY ___________  (__) BETABLOCKER  _________________                                                                                       (__) ACE/ARBS:__________________________    (__) GLP-1 Agonist ___________________                Ride home/Contact #_______________________   (__) SCLT2 inhibitor ___________________

## 2025-01-14 NOTE — TELEPHONE ENCOUNTER
Patient is a 1 day old female infant, delivered at Gestational Age: 37w0d.Vaginal, Spontaneous Delivery at 9:02 PM on 2018          Pregnancy    Information for the patient's mother:  Stephany Moonn [[id number]]   28 year old  Obstetric History       T2      L4     SAB0   TAB0   Ectopic0   Molar0   Multiple0   Live Births5        Estimated Date of Delivery:   Information for the patient's mother:  Red Stephany Miriam [[id number]]   2018, by Last Menstrual Period       Information for the patient's mother:  Stephany Moon [[id number]]     Results for orders placed or performed in visit on 09/10/18   STREP GROUP B CULTURE   Result Value Ref Range    CULTURE       NEGATIVE FOR STREPTOCOCCUS AGALACTIAE (STREP GROUP B)     No antibiotics needed.    Information for the patient's mother:  Stephany Moonn [[id number]]     Current Facility-Administered Medications   Medication   • oxytocin (PITOCIN) 20 Units in sodium chloride 0.9% 1000 mL   • acetaminophen (TYLENOL) tablet 650 mg   • HYDROcodone-acetaminophen (NORCO) 5-325 MG per tablet 1-2 tablet   • ibuprofen (MOTRIN) tablet 600 mg   • measles-mumps-rubella (M-M-R II) vaccine 0.5 mL   • varicella virus (VARIVAX) live vaccine 0.5 mL   • diphtheria-pertussis (acellular)-tetanus (BOOSTRIX) vaccine 0.5 mL   • benzocaine/menthol (DERMOPLAST) 20-0.5 % topical spray 1 spray   • hydroCORTisone (ANUSOL-HC) suppository 25 mg   • simethicone (MYLICON) tablet 80 mg   • docusate sodium-sennosides (SENOKOT S) 50-8.6 MG 2 tablet       Prenatal Labs:   Information for the patient's mother:  Stephany Moon Miriam [[id number]]     Lab Results   Component Value Date/Time    CULT  2018 01:56 PM     NEGATIVE FOR STREPTOCOCCUS AGALACTIAE (STREP GROUP B)    ABR A POSITIVE 2018 11:50 AM    HBAG NEGATIVE 2018 12:05 PM    SYPIGG NONREACTIVE 2018 12:05 PM    HIV NONREACTIVE 2018 12:05 PM    RUBEL 2018 12:05 PM    GCPT  Pt returned your call   NEGATIVE 2018 10:31 AM    GCPT NEGATIVE 2012 01:30 PM    CHPT NEGATIVE 2018 10:31 AM    CHPT NEGATIVE 2012 01:30 PM     Pregnancy Complications: polyhydramnios,peterm labor.  3 children adopted out  1  of SIDS and pneumonia  Gestational diabetes mellitus-Insulin  Epilepsy-Last seizure     Labor and Birth  Delivery Method: Vaginal, Spontaneous Delivery  Rupture Date: 2018  Rupture Time: 1:22 PM  Time of Birth: 9:02 PM  Labor Complications: Dysfunctional Labor  Additional Complications:    Indication for Induction:      Presentation/Position: Vertex Left Occiput Anterior  Resuscitation: Suctioning  Cord Information:   Vessels: 3 Vessels  Complications: None   Nuchal Cord Description:     Number of Loops:    Cord Blood Disposition:    Blood Gases Sent: Yes  Placenta:   Date/Time: 2018  9:07 PM  Removal: Expressed  Appearance: Intact   Measurements:  Weight: 7 lb 3.3 oz (3270 g)    Height: 52.1 cm   Head Circumference: 34.5 cm    Chest Circumference:     Observed Anomalies:   Operative Delivery:  Forceps application location:    Vacuum type:    Vacuum application location:    Shoulder Dystocia: No    [place]   is currently being fed Formula only.  I am aware that mother's feeding choice upon admission was the following:   Information for the patient's mother:  Stephany Moon [[id number]]   Exclusive breast milk feeding  There are no medical contraindications to exclusive breast milk feeding, and the benefits of exclusively breastfeeding were discussed/reinforced with the mother.     First: Urine Occurrence: 1 (18 0640)  Stool Occurrence: 1 (18 0300)    Transcutaneous Bilirubin Result:         No results found for: BILIRUBIN    Cord Blood/[place] Evaluation (CRD)  No results found  Cord pH   Recent Labs   Lab  18   [facility]  7.38             APGARS  One minute Five minutes   Skin color: 0  1    Heart rate: 2  2     Reflex: 1  2    Muscle  tone: 1  2    Breathin  2    Totals:   6  9        PHYSICAL EXAMINATION    VITALS:   Visit Vitals  Pulse 136   Temp 98.7 °F (37.1 °C)   Resp 40   Ht 20.5\" (52.1 cm)   Wt 3.27 kg   HC 34.5 cm (13.58\") Comment: Filed from Delivery Summary   SpO2 97%   BMI 12.06 kg/m²       GENERAL: Baby Girl is an alert, vigorous female with appropriate behavior. She is in no acute distress.  SKIN: Her skin is warm with normal turgor. The color of the skin is pink. There is no rash. There are no bruises or other signs of injury. No significant jaundice.  HEAD: The head is atraumatic and normocephalic. The anterior fontanel is open and flat.  EYES: The conjunctivae appear normal with neither icterus nor subconjunctival hemorrhage.  Red reflexes are seen bilaterally.  EARS: Pinnae normal and external ear canals normal.  NOSE: There is no nasal flaring, nares patent bilaterally.  THROAT: The oropharynx is normal.  There is no cleft of the palate.  NECK: Clavicles without crepitus.  TRUNK AND THORAX: There are no lesions on the trunk; there is no dimple over the presacral area. There are no retractions.  LUNGS: The lung fields are clear to auscultation.  HEART: The precordium is quiet. The heart rhythm is grossly regular. S1 and S2 are normal. There are no murmurs. Normal femoral pulses.  ABDOMEN: The umbilical cord stump is normal. There is not an umbilical hernia. The abdomen is flat and soft.   GENITALIA: normal female genitalia  RECTAL: Anus patent.  EXTREMITIES: Moving all 4 extremities. The hip exam is normal. There are no hip clicks or clunks.    NEUROLOGIC: She displays normal tone throughout. She is not jittery and she has normal  reflexes. [place] reflex present.      ASSESSMENT: Well 1 day old female infant.  Normal growth and development.  Infant of diabetic mother.  Will moniter blood sugars  PLAN: Continue normal  care.  Feeding teaching done with mom.  Routine care.

## 2025-01-15 RX ORDER — GINSENG 100 MG
50 CAPSULE ORAL NIGHTLY
COMMUNITY

## 2025-01-15 RX ORDER — RELUGOLIX 120 MG/1
1 TABLET, FILM COATED ORAL NIGHTLY
COMMUNITY

## 2025-01-15 RX ORDER — CALCIUM CARBONATE/VITAMIN D3 600 MG-10
1 TABLET ORAL 2 TIMES DAILY
COMMUNITY

## 2025-01-15 RX ORDER — DAROLUTAMIDE 300 MG/1
2 TABLET, FILM COATED ORAL 2 TIMES DAILY
COMMUNITY

## 2025-01-15 RX ORDER — TAMSULOSIN HYDROCHLORIDE 0.4 MG/1
0.4 CAPSULE ORAL NIGHTLY
COMMUNITY

## 2025-01-15 RX ORDER — ASPIRIN 81 MG/1
81 TABLET ORAL DAILY
COMMUNITY

## 2025-01-15 RX ORDER — ROSUVASTATIN CALCIUM 20 MG/1
20 TABLET, COATED ORAL NIGHTLY
COMMUNITY

## 2025-01-15 NOTE — PROGRESS NOTES
Surgery Date and Time:    1/17/2025 @ 2:00 pm    Arrival Time:   12:00 pm        The instructions given when and if a patient needs to stop oral intake prior to surgery varies. Follow the instructions you were      given by your Surgeon or RN during the Pre-op call.      __X__Do not eat or drink anything after Midnight the night before the surgery. NO gum, mints, candy or ice chips the day of surgery.      May have Clear Liquids only until 6:00 am 1/17 for surgery at 2 pm.         Only take the following medications with a small sip of water the morning of surgery:   Atenolol & Aspirin        Aspirin, Ibuprofen, Advil, Naproxen, Vitamin E and other Anti-inflammatory products and supplements should be stopped       for 5 -7days before surgery or as directed by your physician.     - Do not smoke or vape, and do not drink any alcoholic beverages 24 hours prior to surgery, this includes NA Beer. Refrain from using     any recreational drugs, including non-prescribed prescription drugs.     -You may brush your teeth and gargle the morning of surgery.  DO NOT SWALLOW WATER.    -You MUST plan for a responsible adult to stay on site while you are here and take you home after your surgery. You will not be allowed                to leave alone or drive yourself home. It is requested someone stay with you the first 24 hrs. Your surgery will be cancelled if you do not                have a ride home with a responsible adult.    -Please wear simple, loose-fitting clothing to the hospital. Do not bring valuables (money, credit cards, checkbooks, etc.)                Do not wear any makeup (including no eye makeup) and no nail polish if applicable.             - DO NOT wear any jewelry or body piercings day of surgery.  All body piercing jewelry must be removed.             - If you have dentures they will be removed before going to the OR; we will provide a container.  If you wear contact lenses                or glasses  they will be removed, bring a case for them or wear glasses day of surgery.                          -If you have a Living Will and Durable Power of  for Healthcare, please bring in a copy to be scanned at registration.   -Notify your Surgeon if you develop any illness between now and the surgery date, cough, cold, fever, sore throat,     nausea, vomiting, etc.  Please notify your  surgeon if you experience dizziness, shortness of breath or blurred vision     between now & the time of your surgery.              -DO NOT shave your operative site less than 4 days prior to surgery. For face & neck surgery, men may use an electric                razor up to 2 days prior to surgery.   -To help prevent infection, change your sheets the night before surgery. Shower the night before and morning of surgery    using antibacterial soap (Dial or Safeguard).     - Do not apply lotion after shower or day of surgery.              -To provide excellent care visitors will be limited to two per room at any given time.              -Please bring your picture ID and insurance card for registration prior to arriving to second floor surgery department.              -If you use a CPAP/BiPAP please bring with you on the day of the surgery. If you use oxygen, please bring portable     tank with you.              -For your convenience [facility] has an outpatient pharmacy on site to fill your prescriptions prior to 5 pm.              -Bring a complete list of all your medications with name and dose including any supplements.              Visitor policy: May have 2-3 visitors in Surgery Waiting Room. Visiting hours are 8a-8p. Overnight visitors will be at the discretion of    the unit nurse.  No visitors under the age of 14 permitted.     *Please call [facility] Preadmission Testing Department for any further questions  [phone] / [phone] - SHILPA Gonzalez      [facility] - MAIN ENTRANCE   [address], [facility]

## 2025-01-16 ENCOUNTER — ANESTHESIA EVENT (OUTPATIENT)
Dept: OPERATING ROOM | Age: 65
End: 2025-01-16
Payer: MEDICARE

## 2025-01-17 ENCOUNTER — APPOINTMENT (OUTPATIENT)
Dept: GENERAL RADIOLOGY | Age: 65
End: 2025-01-17
Attending: SURGERY
Payer: MEDICARE

## 2025-01-17 ENCOUNTER — ANESTHESIA (OUTPATIENT)
Dept: OPERATING ROOM | Age: 65
End: 2025-01-17
Payer: MEDICARE

## 2025-01-17 ENCOUNTER — HOSPITAL ENCOUNTER (OUTPATIENT)
Age: 65
Setting detail: OUTPATIENT SURGERY
Discharge: HOME | End: 2025-01-17
Attending: SURGERY | Admitting: SURGERY
Payer: MEDICARE

## 2025-01-17 VITALS
SYSTOLIC BLOOD PRESSURE: 133 MMHG | BODY MASS INDEX: 25.03 KG/M2 | WEIGHT: 195 LBS | HEIGHT: 74 IN | TEMPERATURE: 97 F | DIASTOLIC BLOOD PRESSURE: 66 MMHG | OXYGEN SATURATION: 100 % | RESPIRATION RATE: 16 BRPM | HEART RATE: 52 BPM

## 2025-01-17 LAB
EKG ATRIAL RATE: 49 BPM
EKG DIAGNOSIS: NORMAL
EKG P AXIS: 10 DEGREES
EKG P-R INTERVAL: 146 MS
EKG Q-T INTERVAL: 452 MS
EKG QRS DURATION: 92 MS
EKG QTC CALCULATION (BAZETT): 408 MS
EKG R AXIS: 15 DEGREES
EKG T AXIS: 6 DEGREES
EKG VENTRICULAR RATE: 49 BPM

## 2025-01-17 PROCEDURE — 3600000012 HC SURGERY LEVEL 2 ADDTL 15MIN: Performed by: SURGERY

## 2025-01-17 PROCEDURE — 93010 ELECTROCARDIOGRAM REPORT: CPT | Performed by: INTERNAL MEDICINE

## 2025-01-17 PROCEDURE — 3700000000 HC ANESTHESIA ATTENDED CARE: Performed by: SURGERY

## 2025-01-17 PROCEDURE — 2709999900 HC NON-CHARGEABLE SUPPLY: Performed by: SURGERY

## 2025-01-17 PROCEDURE — 6360000002 HC RX W HCPCS: Performed by: SURGERY

## 2025-01-17 PROCEDURE — 6360000002 HC RX W HCPCS: Performed by: NURSE ANESTHETIST, CERTIFIED REGISTERED

## 2025-01-17 PROCEDURE — C1788 PORT, INDWELLING, IMP: HCPCS | Performed by: SURGERY

## 2025-01-17 PROCEDURE — 7100000011 HC PHASE II RECOVERY - ADDTL 15 MIN: Performed by: SURGERY

## 2025-01-17 PROCEDURE — 77001 FLUOROGUIDE FOR VEIN DEVICE: CPT

## 2025-01-17 PROCEDURE — 36561 INSERT TUNNELED CV CATH: CPT | Performed by: SURGERY

## 2025-01-17 PROCEDURE — 93005 ELECTROCARDIOGRAM TRACING: CPT | Performed by: ANESTHESIOLOGY

## 2025-01-17 PROCEDURE — 3600000002 HC SURGERY LEVEL 2 BASE: Performed by: SURGERY

## 2025-01-17 PROCEDURE — 2500000003 HC RX 250 WO HCPCS: Performed by: SURGERY

## 2025-01-17 PROCEDURE — 7100000010 HC PHASE II RECOVERY - FIRST 15 MIN: Performed by: SURGERY

## 2025-01-17 PROCEDURE — 2500000003 HC RX 250 WO HCPCS: Performed by: ANESTHESIOLOGY

## 2025-01-17 PROCEDURE — 2580000003 HC RX 258: Performed by: ANESTHESIOLOGY

## 2025-01-17 PROCEDURE — 3700000001 HC ADD 15 MINUTES (ANESTHESIA): Performed by: SURGERY

## 2025-01-17 PROCEDURE — 77001 FLUOROGUIDE FOR VEIN DEVICE: CPT | Performed by: SURGERY

## 2025-01-17 DEVICE — PORT INFUS SGL LUMN ATTCH POLYUR OPN END CATH 8FR POWERPRT: Type: IMPLANTABLE DEVICE | Site: CHEST | Status: FUNCTIONAL

## 2025-01-17 RX ORDER — PROPOFOL 10 MG/ML
INJECTION, EMULSION INTRAVENOUS
Status: DISCONTINUED | OUTPATIENT
Start: 2025-01-17 | End: 2025-01-17 | Stop reason: SDUPTHER

## 2025-01-17 RX ORDER — SODIUM CHLORIDE 0.9 % (FLUSH) 0.9 %
5-40 SYRINGE (ML) INJECTION EVERY 12 HOURS SCHEDULED
Status: CANCELLED | OUTPATIENT
Start: 2025-01-17

## 2025-01-17 RX ORDER — MIDAZOLAM HYDROCHLORIDE 1 MG/ML
INJECTION, SOLUTION INTRAMUSCULAR; INTRAVENOUS
Status: DISCONTINUED | OUTPATIENT
Start: 2025-01-17 | End: 2025-01-17 | Stop reason: SDUPTHER

## 2025-01-17 RX ORDER — SODIUM CHLORIDE 9 MG/ML
INJECTION, SOLUTION INTRAVENOUS PRN
Status: DISCONTINUED | OUTPATIENT
Start: 2025-01-17 | End: 2025-01-17 | Stop reason: HOSPADM

## 2025-01-17 RX ORDER — OXYCODONE HYDROCHLORIDE 5 MG/1
5 TABLET ORAL PRN
Status: CANCELLED | OUTPATIENT
Start: 2025-01-17 | End: 2025-01-17

## 2025-01-17 RX ORDER — SODIUM CHLORIDE 0.9 % (FLUSH) 0.9 %
5-40 SYRINGE (ML) INJECTION PRN
Status: DISCONTINUED | OUTPATIENT
Start: 2025-01-17 | End: 2025-01-17 | Stop reason: HOSPADM

## 2025-01-17 RX ORDER — ONDANSETRON 2 MG/ML
4 INJECTION INTRAMUSCULAR; INTRAVENOUS EVERY 30 MIN PRN
Status: CANCELLED | OUTPATIENT
Start: 2025-01-17

## 2025-01-17 RX ORDER — BUPIVACAINE HYDROCHLORIDE AND EPINEPHRINE 5; 5 MG/ML; UG/ML
INJECTION, SOLUTION PERINEURAL PRN
Status: DISCONTINUED | OUTPATIENT
Start: 2025-01-17 | End: 2025-01-17 | Stop reason: ALTCHOICE

## 2025-01-17 RX ORDER — SODIUM CHLORIDE 9 MG/ML
INJECTION, SOLUTION INTRAVENOUS PRN
Status: CANCELLED | OUTPATIENT
Start: 2025-01-17

## 2025-01-17 RX ORDER — SODIUM CHLORIDE, SODIUM LACTATE, POTASSIUM CHLORIDE, CALCIUM CHLORIDE 600; 310; 30; 20 MG/100ML; MG/100ML; MG/100ML; MG/100ML
INJECTION, SOLUTION INTRAVENOUS CONTINUOUS
Status: DISCONTINUED | OUTPATIENT
Start: 2025-01-17 | End: 2025-01-17 | Stop reason: HOSPADM

## 2025-01-17 RX ORDER — NALOXONE HYDROCHLORIDE 0.4 MG/ML
INJECTION, SOLUTION INTRAMUSCULAR; INTRAVENOUS; SUBCUTANEOUS PRN
Status: CANCELLED | OUTPATIENT
Start: 2025-01-17

## 2025-01-17 RX ORDER — SODIUM CHLORIDE 0.9 % (FLUSH) 0.9 %
5-40 SYRINGE (ML) INJECTION EVERY 12 HOURS SCHEDULED
Status: DISCONTINUED | OUTPATIENT
Start: 2025-01-17 | End: 2025-01-17 | Stop reason: HOSPADM

## 2025-01-17 RX ORDER — LABETALOL HYDROCHLORIDE 5 MG/ML
10 INJECTION, SOLUTION INTRAVENOUS
Status: CANCELLED | OUTPATIENT
Start: 2025-01-17

## 2025-01-17 RX ORDER — SODIUM CHLORIDE 0.9 % (FLUSH) 0.9 %
5-40 SYRINGE (ML) INJECTION PRN
Status: CANCELLED | OUTPATIENT
Start: 2025-01-17

## 2025-01-17 RX ORDER — OXYCODONE HYDROCHLORIDE 5 MG/1
10 TABLET ORAL PRN
Status: CANCELLED | OUTPATIENT
Start: 2025-01-17 | End: 2025-01-17

## 2025-01-17 RX ADMIN — FAMOTIDINE 20 MG: 10 INJECTION, SOLUTION INTRAVENOUS at 12:36

## 2025-01-17 RX ADMIN — MIDAZOLAM 2 MG: 1 INJECTION INTRAMUSCULAR; INTRAVENOUS at 13:12

## 2025-01-17 RX ADMIN — PROPOFOL 50 MG: 10 INJECTION, EMULSION INTRAVENOUS at 13:16

## 2025-01-17 RX ADMIN — PROPOFOL 100 MCG/KG/MIN: 10 INJECTION, EMULSION INTRAVENOUS at 13:18

## 2025-01-17 RX ADMIN — SODIUM CHLORIDE: 9 INJECTION, SOLUTION INTRAVENOUS at 13:09

## 2025-01-17 RX ADMIN — CEFAZOLIN 2000 MG: 2 INJECTION, POWDER, FOR SOLUTION INTRAVENOUS at 13:19

## 2025-01-17 ASSESSMENT — PAIN SCALES - GENERAL
PAINLEVEL_OUTOF10: 0
PAINLEVEL_OUTOF10: 0

## 2025-01-17 ASSESSMENT — PAIN - FUNCTIONAL ASSESSMENT: PAIN_FUNCTIONAL_ASSESSMENT: 0-10

## 2025-01-17 NOTE — OP NOTE
Date of Surgery: 1/17/25    Preop Dx:  Prostate cancer    Postop Dx:  same    Procedure:  Insertion of right internal jugular Power Portacath    Surgeon:  Elijah    Assistant:      Anesthesia:  Mac, local    EBL:   <50ml    Specimen:  none    Complications: none    Drains/Lines:  As above    Indications:  63 yo with need for access for chemotherapy    Description:  Patient was given adequate description of the risks and rewards of the procedure, including bleeding, infection, injury to structures such as the lung and freely consented.  They were given appropriate antibiotics and brought to the OR where MAC anesthesia was induced.  Pt was placed in supine position.  Prepped and draped in usual sterile fashion with rolled towel along spine.     Patient placed in slight Trendelenberg position.  Local anesthetic used to numb along right neck. Using ultrasound guidance the right internal jugular vein accessed with needle.  Through this the wire was placed and under fluoroscopy found to be in SVC.  Needle removed.  Using local anesthetic, site of port pocket anesthetized and then created.  Catheter tunneled from port pocket up to wire insertion site.  Under fluoroscopic guidance the introducer/dilator was inserted over the wire and then the wire and dilator were removed.  Through the introducer the catheter was inserted and the introducer stripped away.  Under fluoroscopy the catheter was found to be in the distal SVC.  It was cut at the appropriate length and connected to the port.  The port was secured to the clavicopectoral fascia using 3-0 prolene suture.  It was tested with heparinized saline and found to aspirate and flush freely.  The port pocket was closed with interrupted 3-0 vicryl and running subcuticular 4-0 monocryl suture.  Wire insertion site closed with 4-0 monocryl.       Sterile dressing placed.  All suture, sponge and instrument count correct times two at end of case.  Transferred to PACU in stable

## 2025-01-17 NOTE — PROGRESS NOTES
Pre and post operative expectations and routines explained to patient, verbalized understanding. EKG completed, VSS.

## 2025-01-17 NOTE — DISCHARGE INSTRUCTIONS
[facility]    Korey Duong M.D.   [facility] Office      [facility] Office          [facility]               [address]                [address]  Mathew Nava M.D.              [address]           [address]            [place] [address]         [place] [address]  Osbaldo Cheema M.D                         [phone]          [facility]                   Bang Camacho M.D.            [facility]      POST-OPERATIVE INSTRUCTIONS FOLLOWING MEDIPORT INSERTION    Call the office to if you have questions or concerns.     You will have either white steri-strips and a water occlusive dressing or surgical glue closing your incisions.    If you have clear bandages over your incisions, you may remove them in 3-4 days.  Leave the steri-stips in place. These will peel away in 7-10 days.     You may shower.  Wash incision gently, and pat dry. Do not rub your incisions.     Watch for signs of infection:    Fever over 100.5°     Excessive warmth or bright redness around your incisions   Leakage of bloody or cloudy fluid from you incisions  ANESTHESIA DISCHARGE INSTRUCTIONS    You are under the influence of drugs- do not drink alcohol, drive a car, operate machinery(such as power tools, kitchen appliances, etc), sign legal documents, or make any important decisions for 24 hours (or while on pain medications).   Children should not ride bikes or skate boards or play on gym sets  for 24 hours after surgery.  A responsible adult should be with you for 24 hours.  Rest at home today- increase activity as tolerated.  Progress slowly to a regular diet unless your physician has instructed you otherwise. Drink plenty of water.    CALL YOUR DOCTOR IF YOU:  Have moderate to severe nausea or vomiting AND are unable to hold down fluids or prescribed medications.  Have bright red bloody drainage

## 2025-01-17 NOTE — ANESTHESIA POSTPROCEDURE EVALUATION
Department of Anesthesiology  Postprocedure Note    Patient: Yousif Martin  MRN: [id number]  YOB: 1960  Date of evaluation: 1/17/2025    Procedure Summary       Date: 01/17/25 Room / Location: [address]    Anesthesia Start: 1313 Anesthesia Stop: 1356    Procedure: PORT INSERTION (Right: Chest) Diagnosis:       Prostate cancer (HCC)      (Prostate cancer (HCC) [C61])    Surgeons: Jose Nava MD Responsible Provider: Darius Miles MD    Anesthesia Type: MAC ASA Status: 2            Anesthesia Type: MAC    Charan Phase I: Charan Score: 10    Charan Phase II: Charan Score: 10    Anesthesia Post Evaluation    Patient location during evaluation: PACU  Level of consciousness: awake  Airway patency: patent  Nausea & Vomiting: no vomiting  Cardiovascular status: blood pressure returned to baseline  Respiratory status: acceptable  Hydration status: stable  Pain management: adequate    No notable events documented.

## 2025-01-17 NOTE — ANESTHESIA PRE PROCEDURE
Department of Anesthesiology  Preprocedure Note       Name:  Yousif Martin   Age:  64 y.o.  :  1960                                          MRN:  [id number]         Date:  2025      Surgeon: Surgeon(s):  Jose Nava MD    Procedure: Procedure(s):  PORT INSERTION    Medications prior to admission:   Prior to Admission medications    Medication Sig Start Date End Date Taking? Authorizing Provider   aspirin 81 MG EC tablet Take 1 tablet by mouth daily   Yes Caleb Parsons MD   tamsulosin (FLOMAX) 0.4 MG capsule Take 1 capsule by mouth nightly   Yes ProviderCaleb MD   Darolutamide (NUBEQA) 300 MG TABS Take 2 tablets by mouth 2 times daily   Yes ProviderCaleb MD   Relugolix (ORGOVYX) 120 MG TABS Take 1 tablet by mouth nightly   Yes ProviderCaleb MD   zinc 50 MG TABS tablet Take 1 tablet by mouth nightly   Yes ProviderCaleb MD   rosuvastatin (CRESTOR) 20 MG tablet Take 1 tablet by mouth nightly   Yes ProviderCaleb MD   calcium carb-cholecalciferol (CALCIUM + VITAMIN D3) 600-10 MG-MCG TABS per tab Take 1 tablet by mouth 2 times daily   Yes ProviderCaleb MD   dilTIAZem (CARDIZEM) 60 MG tablet Take 1 tablet by mouth daily as needed (every 6 hours as needed for paroxysmal atrial fibrillation) 21   Derrell Stearns MD   atenolol (TENORMIN) 50 MG tablet Take 1 tablet by mouth daily for 7 days  Patient taking differently: Take 1 tablet by mouth 2 times daily 19  Nubia Cooper MD       Current medications:    Current Facility-Administered Medications   Medication Dose Route Frequency Provider Last Rate Last Admin   • famotidine (PEPCID) 20 mg in sodium chloride (PF) 0.9 % 10 mL injection  20 mg IntraVENous Once Ronald Mead MD       • lactated ringers infusion   IntraVENous Continuous Ronald Mead MD       • sodium chloride flush 0.9 % injection 5-40 mL  5-40 mL IntraVENous 2 times per day

## 2025-01-17 NOTE — H&P
I have reviewed the history and physical and examined the patient.  I find no relevant changes.  I have reviewed with the patient and/or family members, during the preoperative office visit the risks, benefits, and alternatives to the procedure.    Jose Nava MD

## 2025-03-27 ENCOUNTER — HOSPITAL ENCOUNTER (OUTPATIENT)
Dept: GENERAL RADIOLOGY | Age: 65
Discharge: HOME | End: 2025-03-27
Payer: MEDICARE

## 2025-03-27 DIAGNOSIS — C61 MALIGNANT NEOPLASM OF PROSTATE (HCC): ICD-10-CM

## 2025-03-27 PROCEDURE — 73502 X-RAY EXAM HIP UNI 2-3 VIEWS: CPT

## 2025-04-04 ENCOUNTER — HOSPITAL ENCOUNTER (OUTPATIENT)
Dept: MRI IMAGING | Age: 65
Discharge: HOME | End: 2025-04-04
Payer: MEDICARE

## 2025-04-04 DIAGNOSIS — C61 MALIGNANT NEOPLASM OF PROSTATE (HCC): ICD-10-CM

## 2025-04-04 DIAGNOSIS — C79.51 METASTASIS TO BONE (HCC): ICD-10-CM

## 2025-04-04 DIAGNOSIS — M25.551 RIGHT HIP PAIN: ICD-10-CM

## 2025-04-04 PROCEDURE — 73723 MRI JOINT LWR EXTR W/O&W/DYE: CPT

## 2025-04-04 PROCEDURE — 6360000004 HC RX CONTRAST MEDICATION: Performed by: INTERNAL MEDICINE

## 2025-04-04 PROCEDURE — A9579 GAD-BASE MR CONTRAST NOS,1ML: HCPCS | Performed by: INTERNAL MEDICINE

## 2025-04-04 RX ADMIN — GADOTERIDOL 17 ML: 279.3 INJECTION, SOLUTION INTRAVENOUS at 12:23

## (undated) DEVICE — GLOVE SURG SZ 7 L12IN FNGR THK75MIL WHT LTX POLYMER BEAD

## (undated) DEVICE — GOWN SIRUS NONREIN XL W/TWL: Brand: MEDLINE INDUSTRIES, INC.

## (undated) DEVICE — DRAPE C ARM W46XL120IN XLN

## (undated) DEVICE — MINOR SET UP MHAZ: Brand: MEDLINE INDUSTRIES, INC.

## (undated) DEVICE — SUTURE PROL 2-0 L48IN NONABSORBABLE BLU SH L26MM 1/2 CIR 8533H

## (undated) DEVICE — SUTURE MONOCRYL + SZ 4-0 L18IN ABSRB UD L19MM PS-2 3/8 CIR MCP496G

## (undated) DEVICE — DRAPE,T,LAPARO,TRANS,STERILE: Brand: MEDLINE

## (undated) DEVICE — INTENDED FOR TISSUE SEPARATION, AND OTHER PROCEDURES THAT REQUIRE A SHARP SURGICAL BLADE TO PUNCTURE OR CUT.: Brand: BARD-PARKER ® STAINLESS STEEL BLADES

## (undated) DEVICE — SUTURE VICRYL + SZ 3-0 L18IN ABSRB UD SH 1/2 CIR TAPERCUT NDL VCP864D

## (undated) DEVICE — PROVE COVER: Brand: UNBRANDED

## (undated) DEVICE — NEPTUNE E-SEP SMOKE EVACUATION PENCIL, COATED, 70MM BLADE, PUSH BUTTON SWITCH: Brand: NEPTUNE E-SEP